# Patient Record
Sex: FEMALE | Race: WHITE | NOT HISPANIC OR LATINO | Employment: OTHER | URBAN - METROPOLITAN AREA
[De-identification: names, ages, dates, MRNs, and addresses within clinical notes are randomized per-mention and may not be internally consistent; named-entity substitution may affect disease eponyms.]

---

## 2018-07-09 ENCOUNTER — APPOINTMENT (OUTPATIENT)
Dept: GENERAL RADIOLOGY | Facility: HOSPITAL | Age: 78
End: 2018-07-09

## 2018-07-09 ENCOUNTER — HOSPITAL ENCOUNTER (OUTPATIENT)
Facility: HOSPITAL | Age: 78
Setting detail: OBSERVATION
LOS: 1 days | Discharge: HOME | End: 2018-07-12
Attending: EMERGENCY MEDICINE | Admitting: EMERGENCY MEDICINE

## 2018-07-09 ENCOUNTER — APPOINTMENT (OUTPATIENT)
Dept: CARDIOLOGY | Facility: HOSPITAL | Age: 78
End: 2018-07-09
Attending: FAMILY MEDICINE

## 2018-07-09 ENCOUNTER — APPOINTMENT (OUTPATIENT)
Dept: MRI IMAGING | Facility: HOSPITAL | Age: 78
End: 2018-07-09
Attending: EMERGENCY MEDICINE

## 2018-07-09 ENCOUNTER — APPOINTMENT (OUTPATIENT)
Dept: CT IMAGING | Facility: HOSPITAL | Age: 78
End: 2018-07-09

## 2018-07-09 DIAGNOSIS — Z74.09 IMPAIRED MOBILITY AND ADLS: ICD-10-CM

## 2018-07-09 DIAGNOSIS — I50.9 ACUTE CONGESTIVE HEART FAILURE, UNSPECIFIED CONGESTIVE HEART FAILURE TYPE: Primary | ICD-10-CM

## 2018-07-09 DIAGNOSIS — I48.20 CHRONIC ATRIAL FIBRILLATION (HCC): ICD-10-CM

## 2018-07-09 DIAGNOSIS — Z74.09 IMPAIRED FUNCTIONAL MOBILITY, BALANCE, GAIT, AND ENDURANCE: ICD-10-CM

## 2018-07-09 DIAGNOSIS — E87.6 HYPOKALEMIA: ICD-10-CM

## 2018-07-09 DIAGNOSIS — E79.0 ABNORMAL BLOOD LEVEL OF URIC ACID: ICD-10-CM

## 2018-07-09 DIAGNOSIS — M51.36 DDD (DEGENERATIVE DISC DISEASE), LUMBAR: ICD-10-CM

## 2018-07-09 DIAGNOSIS — R79.89 POSITIVE D DIMER: ICD-10-CM

## 2018-07-09 DIAGNOSIS — Z79.01 CHRONIC ANTICOAGULATION: ICD-10-CM

## 2018-07-09 DIAGNOSIS — M60.9 MYOSITIS OF LOWER EXTREMITY, UNSPECIFIED LATERALITY, UNSPECIFIED MYOSITIS TYPE: ICD-10-CM

## 2018-07-09 DIAGNOSIS — R09.02 HYPOXIA: ICD-10-CM

## 2018-07-09 DIAGNOSIS — R26.2 DETERIORATION IN ABILITY TO WALK: ICD-10-CM

## 2018-07-09 DIAGNOSIS — Z78.9 IMPAIRED MOBILITY AND ADLS: ICD-10-CM

## 2018-07-09 PROBLEM — M10.9 GOUT: Chronic | Status: ACTIVE | Noted: 2018-07-09

## 2018-07-09 PROBLEM — R73.9 HYPERGLYCEMIA: Status: ACTIVE | Noted: 2018-07-09

## 2018-07-09 PROBLEM — D64.9 ANEMIA: Status: ACTIVE | Noted: 2018-07-09

## 2018-07-09 PROBLEM — I10 HYPERTENSION: Chronic | Status: ACTIVE | Noted: 2018-07-09

## 2018-07-09 PROBLEM — R74.8 ELEVATED CK: Status: ACTIVE | Noted: 2018-07-09

## 2018-07-09 PROBLEM — R74.01 TRANSAMINITIS: Status: ACTIVE | Noted: 2018-07-09

## 2018-07-09 PROBLEM — D72.829 LEUKOCYTOSIS: Status: ACTIVE | Noted: 2018-07-09

## 2018-07-09 LAB
ALBUMIN SERPL-MCNC: 4.24 G/DL (ref 3.2–4.8)
ALBUMIN/GLOB SERPL: 1.5 G/DL (ref 1.5–2.5)
ALP SERPL-CCNC: 191 U/L (ref 25–100)
ALT SERPL W P-5'-P-CCNC: 9 U/L (ref 7–40)
ANION GAP SERPL CALCULATED.3IONS-SCNC: 10 MMOL/L (ref 3–11)
AST SERPL-CCNC: 19 U/L (ref 0–33)
BACTERIA UR QL AUTO: NORMAL /HPF
BASOPHILS # BLD AUTO: 0.06 10*3/MM3 (ref 0–0.2)
BASOPHILS NFR BLD AUTO: 0.5 % (ref 0–1)
BH CV ECHO MEAS - AI DEC SLOPE: 375.7 CM/SEC^2
BH CV ECHO MEAS - AI MAX PG: 82.4 MMHG
BH CV ECHO MEAS - AI MAX VEL: 453.4 CM/SEC
BH CV ECHO MEAS - AI P1/2T: 353.5 MSEC
BH CV ECHO MEAS - AO MAX PG (FULL): 4.4 MMHG
BH CV ECHO MEAS - AO MAX PG: 9 MMHG
BH CV ECHO MEAS - AO MEAN PG (FULL): 2.7 MMHG
BH CV ECHO MEAS - AO MEAN PG: 5 MMHG
BH CV ECHO MEAS - AO ROOT AREA (BSA CORRECTED): 1.2
BH CV ECHO MEAS - AO ROOT AREA: 4.5 CM^2
BH CV ECHO MEAS - AO ROOT DIAM: 2.4 CM
BH CV ECHO MEAS - AO V2 MAX: 152.2 CM/SEC
BH CV ECHO MEAS - AO V2 MEAN: 104.4 CM/SEC
BH CV ECHO MEAS - AO V2 VTI: 29 CM
BH CV ECHO MEAS - AVA(I,A): 2.1 CM^2
BH CV ECHO MEAS - AVA(I,D): 2.1 CM^2
BH CV ECHO MEAS - AVA(V,A): 2.2 CM^2
BH CV ECHO MEAS - AVA(V,D): 2.2 CM^2
BH CV ECHO MEAS - BSA(HAYCOCK): 2.1 M^2
BH CV ECHO MEAS - BSA: 2.1 M^2
BH CV ECHO MEAS - BZI_BMI: 32.6 KILOGRAMS/M^2
BH CV ECHO MEAS - BZI_METRIC_HEIGHT: 170.2 CM
BH CV ECHO MEAS - BZI_METRIC_WEIGHT: 94.3 KG
BH CV ECHO MEAS - CONTRAST EF 4CH: 59 ML/M^2
BH CV ECHO MEAS - EDV(MOD-SP4): 61 ML
BH CV ECHO MEAS - EF(MOD-SP4): 59 %
BH CV ECHO MEAS - ESV(MOD-SP4): 25 ML
BH CV ECHO MEAS - IVSD: 0.7 CM
BH CV ECHO MEAS - LA DIMENSION: 4.3 CM
BH CV ECHO MEAS - LA/AO: 1.8
BH CV ECHO MEAS - LAT PEAK E' VEL: 10.5 CM/SEC
BH CV ECHO MEAS - LV DIASTOLIC VOL/BSA (35-75): 29.7 ML/M^2
BH CV ECHO MEAS - LV MAX PG: 4.6 MMHG
BH CV ECHO MEAS - LV MEAN PG: 2.3 MMHG
BH CV ECHO MEAS - LV SYSTOLIC VOL/BSA (12-30): 12.2 ML/M^2
BH CV ECHO MEAS - LV V1 MAX: 107.5 CM/SEC
BH CV ECHO MEAS - LV V1 MEAN: 69.7 CM/SEC
BH CV ECHO MEAS - LV V1 VTI: 19.6 CM
BH CV ECHO MEAS - LVIDD: 4.7 CM
BH CV ECHO MEAS - LVIDS: 3 CM
BH CV ECHO MEAS - LVLD AP4: 6.3 CM
BH CV ECHO MEAS - LVLS AP4: 5.7 CM
BH CV ECHO MEAS - LVOT AREA (M): 3.1 CM^2
BH CV ECHO MEAS - LVOT AREA: 3.1 CM^2
BH CV ECHO MEAS - LVOT DIAM: 2 CM
BH CV ECHO MEAS - LVPWD: 0.7 CM
BH CV ECHO MEAS - MED PEAK E' VEL: 7.2 CM/SEC
BH CV ECHO MEAS - MV DEC TIME: 0.12 SEC
BH CV ECHO MEAS - MV E MAX VEL: 138.7 CM/SEC
BH CV ECHO MEAS - RAP SYSTOLE: 15 MMHG
BH CV ECHO MEAS - RVSP: 66 MMHG
BH CV ECHO MEAS - SI(AO): 63.7 ML/M^2
BH CV ECHO MEAS - SI(LVOT): 29.1 ML/M^2
BH CV ECHO MEAS - SI(MOD-SP4): 17.5 ML/M^2
BH CV ECHO MEAS - SV(AO): 131 ML
BH CV ECHO MEAS - SV(LVOT): 59.9 ML
BH CV ECHO MEAS - SV(MOD-SP4): 36 ML
BH CV ECHO MEAS - TAPSE (>1.6): 2 CM2
BH CV ECHO MEAS - TR MAX V: 51 MMHG
BH CV ECHO MEAS - TR MAX VEL: 358 CM/SEC
BH CV ECHO MEASUREMENTS AVERAGE E/E' RATIO: 15.67
BH CV VAS BP LEFT ARM: NORMAL MMHG
BH CV XLRA - RV BASE: 4.4 CM
BH CV XLRA - RV LENGTH: 7.1 CM
BH CV XLRA - RV MID: 3.8 CM
BH CV XLRA - TDI S': 8.9 CM/SEC
BILIRUB SERPL-MCNC: 1.1 MG/DL (ref 0.3–1.2)
BILIRUB UR QL STRIP: NEGATIVE
BNP SERPL-MCNC: 420 PG/ML (ref 0–100)
BUN BLD-MCNC: 22 MG/DL (ref 9–23)
BUN/CREAT SERPL: 19.1 (ref 7–25)
CALCIUM SPEC-SCNC: 8.3 MG/DL (ref 8.7–10.4)
CHLORIDE SERPL-SCNC: 108 MMOL/L (ref 99–109)
CK SERPL-CCNC: 342 U/L (ref 26–174)
CLARITY UR: CLEAR
CO2 SERPL-SCNC: 26 MMOL/L (ref 20–31)
COLOR UR: YELLOW
CREAT BLD-MCNC: 1.15 MG/DL (ref 0.6–1.3)
D DIMER PPP FEU-MCNC: 1.36 MG/L (FEU) (ref 0–0.5)
DEPRECATED RDW RBC AUTO: 57.3 FL (ref 37–54)
EOSINOPHIL # BLD AUTO: 0.21 10*3/MM3 (ref 0–0.3)
EOSINOPHIL NFR BLD AUTO: 1.8 % (ref 0–3)
ERYTHROCYTE [DISTWIDTH] IN BLOOD BY AUTOMATED COUNT: 18.8 % (ref 11.3–14.5)
ERYTHROCYTE [SEDIMENTATION RATE] IN BLOOD: 39 MM/HR (ref 0–30)
GFR SERPL CREATININE-BSD FRML MDRD: 46 ML/MIN/1.73
GFR SERPL CREATININE-BSD FRML MDRD: 55 ML/MIN/1.73
GLOBULIN UR ELPH-MCNC: 2.8 GM/DL
GLUCOSE BLD-MCNC: 159 MG/DL (ref 70–100)
GLUCOSE UR STRIP-MCNC: NEGATIVE MG/DL
HAV IGM SERPL QL IA: NORMAL
HBV CORE IGM SERPL QL IA: NORMAL
HBV SURFACE AG SERPL QL IA: NORMAL
HCT VFR BLD AUTO: 32 % (ref 34.5–44)
HCV AB SER DONR QL: NORMAL
HGB BLD-MCNC: 9.8 G/DL (ref 11.5–15.5)
HGB UR QL STRIP.AUTO: ABNORMAL
HOLD SPECIMEN: NORMAL
HOLD SPECIMEN: NORMAL
HYALINE CASTS UR QL AUTO: NORMAL /LPF
IMM GRANULOCYTES # BLD: 0.06 10*3/MM3 (ref 0–0.03)
IMM GRANULOCYTES NFR BLD: 0.5 % (ref 0–0.6)
INR PPP: 1.95 (ref 0.91–1.09)
INR PPP: 1.96 (ref 0.91–1.09)
KETONES UR QL STRIP: NEGATIVE
LEFT ATRIUM VOLUME INDEX: 55 ML/M2
LEFT ATRIUM VOLUME: 114 CM3
LEUKOCYTE ESTERASE UR QL STRIP.AUTO: ABNORMAL
LV EF 2D ECHO EST: 70 %
LYMPHOCYTES # BLD AUTO: 1.19 10*3/MM3 (ref 0.6–4.8)
LYMPHOCYTES NFR BLD AUTO: 10.2 % (ref 24–44)
MAXIMAL PREDICTED HEART RATE: 143 BPM
MCH RBC QN AUTO: 26.1 PG (ref 27–31)
MCHC RBC AUTO-ENTMCNC: 30.6 G/DL (ref 32–36)
MCV RBC AUTO: 85.1 FL (ref 80–99)
MONOCYTES # BLD AUTO: 1.09 10*3/MM3 (ref 0–1)
MONOCYTES NFR BLD AUTO: 9.4 % (ref 0–12)
NEUTROPHILS # BLD AUTO: 9.09 10*3/MM3 (ref 1.5–8.3)
NEUTROPHILS NFR BLD AUTO: 78.1 % (ref 41–71)
NITRITE UR QL STRIP: NEGATIVE
PH UR STRIP.AUTO: <=5 [PH] (ref 5–8)
PLATELET # BLD AUTO: 243 10*3/MM3 (ref 150–450)
PMV BLD AUTO: 11.4 FL (ref 6–12)
POTASSIUM BLD-SCNC: 3.3 MMOL/L (ref 3.5–5.5)
PROT SERPL-MCNC: 7 G/DL (ref 5.7–8.2)
PROT UR QL STRIP: ABNORMAL
PROTHROMBIN TIME: 20.5 SECONDS (ref 9.6–11.5)
PROTHROMBIN TIME: 20.6 SECONDS (ref 9.6–11.5)
RBC # BLD AUTO: 3.76 10*6/MM3 (ref 3.89–5.14)
RBC # UR: NORMAL /HPF
REF LAB TEST METHOD: NORMAL
SODIUM BLD-SCNC: 144 MMOL/L (ref 132–146)
SP GR UR STRIP: 1.02 (ref 1–1.03)
SQUAMOUS #/AREA URNS HPF: NORMAL /HPF
STRESS TARGET HR: 122 BPM
TROPONIN I SERPL-MCNC: 0.02 NG/ML (ref 0–0.07)
TSH SERPL DL<=0.05 MIU/L-ACNC: 2.01 MIU/ML (ref 0.35–5.35)
URATE SERPL-MCNC: 8.4 MG/DL (ref 3.1–7.8)
UROBILINOGEN UR QL STRIP: ABNORMAL
WBC NRBC COR # BLD: 11.64 10*3/MM3 (ref 3.5–10.8)
WBC UR QL AUTO: NORMAL /HPF
WHOLE BLOOD HOLD SPECIMEN: NORMAL
WHOLE BLOOD HOLD SPECIMEN: NORMAL

## 2018-07-09 PROCEDURE — 96374 THER/PROPH/DIAG INJ IV PUSH: CPT

## 2018-07-09 PROCEDURE — 80074 ACUTE HEPATITIS PANEL: CPT | Performed by: FAMILY MEDICINE

## 2018-07-09 PROCEDURE — 96375 TX/PRO/DX INJ NEW DRUG ADDON: CPT

## 2018-07-09 PROCEDURE — 84484 ASSAY OF TROPONIN QUANT: CPT

## 2018-07-09 PROCEDURE — 85652 RBC SED RATE AUTOMATED: CPT | Performed by: EMERGENCY MEDICINE

## 2018-07-09 PROCEDURE — 96372 THER/PROPH/DIAG INJ SC/IM: CPT

## 2018-07-09 PROCEDURE — 80053 COMPREHEN METABOLIC PANEL: CPT | Performed by: EMERGENCY MEDICINE

## 2018-07-09 PROCEDURE — 99220 PR INITIAL OBSERVATION CARE/DAY 70 MINUTES: CPT | Performed by: FAMILY MEDICINE

## 2018-07-09 PROCEDURE — 85610 PROTHROMBIN TIME: CPT | Performed by: FAMILY MEDICINE

## 2018-07-09 PROCEDURE — 72148 MRI LUMBAR SPINE W/O DYE: CPT

## 2018-07-09 PROCEDURE — 93306 TTE W/DOPPLER COMPLETE: CPT

## 2018-07-09 PROCEDURE — 85610 PROTHROMBIN TIME: CPT | Performed by: EMERGENCY MEDICINE

## 2018-07-09 PROCEDURE — G0378 HOSPITAL OBSERVATION PER HR: HCPCS

## 2018-07-09 PROCEDURE — 25010000002 ENOXAPARIN PER 10 MG: Performed by: FAMILY MEDICINE

## 2018-07-09 PROCEDURE — 99284 EMERGENCY DEPT VISIT MOD MDM: CPT

## 2018-07-09 PROCEDURE — 93005 ELECTROCARDIOGRAM TRACING: CPT | Performed by: EMERGENCY MEDICINE

## 2018-07-09 PROCEDURE — 25010000002 FUROSEMIDE PER 20 MG: Performed by: EMERGENCY MEDICINE

## 2018-07-09 PROCEDURE — 84550 ASSAY OF BLOOD/URIC ACID: CPT | Performed by: EMERGENCY MEDICINE

## 2018-07-09 PROCEDURE — 93306 TTE W/DOPPLER COMPLETE: CPT | Performed by: INTERNAL MEDICINE

## 2018-07-09 PROCEDURE — 71045 X-RAY EXAM CHEST 1 VIEW: CPT

## 2018-07-09 PROCEDURE — 71275 CT ANGIOGRAPHY CHEST: CPT

## 2018-07-09 PROCEDURE — 81001 URINALYSIS AUTO W/SCOPE: CPT | Performed by: FAMILY MEDICINE

## 2018-07-09 PROCEDURE — 96376 TX/PRO/DX INJ SAME DRUG ADON: CPT

## 2018-07-09 PROCEDURE — 83880 ASSAY OF NATRIURETIC PEPTIDE: CPT | Performed by: EMERGENCY MEDICINE

## 2018-07-09 PROCEDURE — 25010000002 FUROSEMIDE PER 20 MG: Performed by: FAMILY MEDICINE

## 2018-07-09 PROCEDURE — 82550 ASSAY OF CK (CPK): CPT | Performed by: EMERGENCY MEDICINE

## 2018-07-09 PROCEDURE — 0 IOPAMIDOL PER 1 ML: Performed by: FAMILY MEDICINE

## 2018-07-09 PROCEDURE — 84443 ASSAY THYROID STIM HORMONE: CPT | Performed by: EMERGENCY MEDICINE

## 2018-07-09 PROCEDURE — 85379 FIBRIN DEGRADATION QUANT: CPT | Performed by: EMERGENCY MEDICINE

## 2018-07-09 PROCEDURE — 85025 COMPLETE CBC W/AUTO DIFF WBC: CPT | Performed by: EMERGENCY MEDICINE

## 2018-07-09 PROCEDURE — 25010000002 DEXAMETHASONE PER 1 MG: Performed by: EMERGENCY MEDICINE

## 2018-07-09 RX ORDER — FUROSEMIDE 10 MG/ML
20 INJECTION INTRAMUSCULAR; INTRAVENOUS ONCE
Status: COMPLETED | OUTPATIENT
Start: 2018-07-09 | End: 2018-07-09

## 2018-07-09 RX ORDER — FUROSEMIDE 20 MG/1
40 TABLET ORAL DAILY
COMMUNITY

## 2018-07-09 RX ORDER — METOPROLOL SUCCINATE 50 MG/1
200 TABLET, EXTENDED RELEASE ORAL DAILY
Status: DISCONTINUED | OUTPATIENT
Start: 2018-07-10 | End: 2018-07-12 | Stop reason: HOSPADM

## 2018-07-09 RX ORDER — IPRATROPIUM BROMIDE AND ALBUTEROL SULFATE 2.5; .5 MG/3ML; MG/3ML
3 SOLUTION RESPIRATORY (INHALATION) EVERY 4 HOURS PRN
Status: DISCONTINUED | OUTPATIENT
Start: 2018-07-09 | End: 2018-07-12 | Stop reason: HOSPADM

## 2018-07-09 RX ORDER — PROMETHAZINE HYDROCHLORIDE 25 MG/ML
12.5 INJECTION, SOLUTION INTRAMUSCULAR; INTRAVENOUS EVERY 6 HOURS PRN
Status: DISCONTINUED | OUTPATIENT
Start: 2018-07-09 | End: 2018-07-12 | Stop reason: HOSPADM

## 2018-07-09 RX ORDER — POTASSIUM CHLORIDE 1.5 G/1.77G
40 POWDER, FOR SOLUTION ORAL AS NEEDED
Status: DISCONTINUED | OUTPATIENT
Start: 2018-07-09 | End: 2018-07-12 | Stop reason: HOSPADM

## 2018-07-09 RX ORDER — WARFARIN SODIUM 1 MG/1
1 TABLET ORAL
Status: DISCONTINUED | OUTPATIENT
Start: 2018-07-10 | End: 2018-07-12

## 2018-07-09 RX ORDER — ALLOPURINOL 100 MG/1
100 TABLET ORAL 2 TIMES DAILY
Status: DISCONTINUED | OUTPATIENT
Start: 2018-07-09 | End: 2018-07-12 | Stop reason: HOSPADM

## 2018-07-09 RX ORDER — LISINOPRIL 5 MG/1
5 TABLET ORAL DAILY
Status: DISCONTINUED | OUTPATIENT
Start: 2018-07-09 | End: 2018-07-12 | Stop reason: HOSPADM

## 2018-07-09 RX ORDER — ACETAMINOPHEN 325 MG/1
650 TABLET ORAL EVERY 6 HOURS PRN
Status: DISCONTINUED | OUTPATIENT
Start: 2018-07-09 | End: 2018-07-12 | Stop reason: HOSPADM

## 2018-07-09 RX ORDER — PROMETHAZINE HYDROCHLORIDE 12.5 MG/1
12.5 TABLET ORAL EVERY 6 HOURS PRN
Status: DISCONTINUED | OUTPATIENT
Start: 2018-07-09 | End: 2018-07-12 | Stop reason: HOSPADM

## 2018-07-09 RX ORDER — POTASSIUM CHLORIDE 750 MG/1
40 CAPSULE, EXTENDED RELEASE ORAL AS NEEDED
Status: DISCONTINUED | OUTPATIENT
Start: 2018-07-09 | End: 2018-07-12 | Stop reason: HOSPADM

## 2018-07-09 RX ORDER — PROMETHAZINE HYDROCHLORIDE 12.5 MG/1
12.5 SUPPOSITORY RECTAL EVERY 6 HOURS PRN
Status: DISCONTINUED | OUTPATIENT
Start: 2018-07-09 | End: 2018-07-12 | Stop reason: HOSPADM

## 2018-07-09 RX ORDER — POTASSIUM CHLORIDE 7.45 MG/ML
10 INJECTION INTRAVENOUS
Status: DISCONTINUED | OUTPATIENT
Start: 2018-07-09 | End: 2018-07-12 | Stop reason: HOSPADM

## 2018-07-09 RX ORDER — WARFARIN SODIUM 1 MG/1
1 TABLET ORAL
COMMUNITY

## 2018-07-09 RX ORDER — SODIUM CHLORIDE 0.9 % (FLUSH) 0.9 %
10 SYRINGE (ML) INJECTION AS NEEDED
Status: DISCONTINUED | OUTPATIENT
Start: 2018-07-09 | End: 2018-07-12 | Stop reason: HOSPADM

## 2018-07-09 RX ORDER — CALCIUM CARBONATE 200(500)MG
2 TABLET,CHEWABLE ORAL 2 TIMES DAILY PRN
Status: DISCONTINUED | OUTPATIENT
Start: 2018-07-09 | End: 2018-07-12 | Stop reason: HOSPADM

## 2018-07-09 RX ORDER — DEXAMETHASONE SODIUM PHOSPHATE 4 MG/ML
8 INJECTION, SOLUTION INTRA-ARTICULAR; INTRALESIONAL; INTRAMUSCULAR; INTRAVENOUS; SOFT TISSUE ONCE
Status: COMPLETED | OUTPATIENT
Start: 2018-07-09 | End: 2018-07-09

## 2018-07-09 RX ORDER — SODIUM CHLORIDE 0.9 % (FLUSH) 0.9 %
1-10 SYRINGE (ML) INJECTION AS NEEDED
Status: DISCONTINUED | OUTPATIENT
Start: 2018-07-09 | End: 2018-07-12 | Stop reason: HOSPADM

## 2018-07-09 RX ORDER — METOPROLOL SUCCINATE 200 MG/1
200 TABLET, EXTENDED RELEASE ORAL DAILY
COMMUNITY

## 2018-07-09 RX ORDER — AMLODIPINE BESYLATE 5 MG/1
5 TABLET ORAL DAILY
COMMUNITY
End: 2018-07-12 | Stop reason: HOSPADM

## 2018-07-09 RX ORDER — LIDOCAINE 50 MG/G
1 PATCH TOPICAL
Status: DISCONTINUED | OUTPATIENT
Start: 2018-07-09 | End: 2018-07-12 | Stop reason: HOSPADM

## 2018-07-09 RX ORDER — ALLOPURINOL 100 MG/1
100 TABLET ORAL 2 TIMES DAILY
COMMUNITY

## 2018-07-09 RX ORDER — POTASSIUM CHLORIDE 750 MG/1
40 CAPSULE, EXTENDED RELEASE ORAL ONCE
Status: COMPLETED | OUTPATIENT
Start: 2018-07-09 | End: 2018-07-09

## 2018-07-09 RX ORDER — FUROSEMIDE 10 MG/ML
20 INJECTION INTRAMUSCULAR; INTRAVENOUS EVERY 12 HOURS
Status: DISCONTINUED | OUTPATIENT
Start: 2018-07-09 | End: 2018-07-12

## 2018-07-09 RX ORDER — FUROSEMIDE 10 MG/ML
20 INJECTION INTRAMUSCULAR; INTRAVENOUS ONCE
Status: DISCONTINUED | OUTPATIENT
Start: 2018-07-09 | End: 2018-07-09

## 2018-07-09 RX ADMIN — POTASSIUM CHLORIDE 40 MEQ: 750 CAPSULE, EXTENDED RELEASE ORAL at 12:55

## 2018-07-09 RX ADMIN — POTASSIUM CHLORIDE 40 MEQ: 750 CAPSULE, EXTENDED RELEASE ORAL at 16:52

## 2018-07-09 RX ADMIN — ENOXAPARIN SODIUM 30 MG: 30 INJECTION SUBCUTANEOUS at 16:52

## 2018-07-09 RX ADMIN — DEXAMETHASONE SODIUM PHOSPHATE 8 MG: 4 INJECTION, SOLUTION INTRAMUSCULAR; INTRAVENOUS at 12:56

## 2018-07-09 RX ADMIN — ALLOPURINOL 100 MG: 100 TABLET ORAL at 21:18

## 2018-07-09 RX ADMIN — FUROSEMIDE 20 MG: 10 INJECTION, SOLUTION INTRAMUSCULAR; INTRAVENOUS at 21:18

## 2018-07-09 RX ADMIN — LISINOPRIL 5 MG: 5 TABLET ORAL at 16:52

## 2018-07-09 RX ADMIN — IOPAMIDOL 90 ML: 755 INJECTION, SOLUTION INTRAVENOUS at 13:23

## 2018-07-09 RX ADMIN — FUROSEMIDE 20 MG: 10 INJECTION, SOLUTION INTRAMUSCULAR; INTRAVENOUS at 14:20

## 2018-07-09 NOTE — ED PROVIDER NOTES
Subjective   Ms. Zee Stewart is a 78 y.o. female who presents to the ED with c/o SoA. She reports that on June 25th she had an appointment to see her PCP, Dr. Becker, for cellulitis and was able to drive herself there with no problems. Then on July 5th she went back to her PCP for a follow up but fell that morning and has not been able to walk 2/2 leg weakness since then. She also complains of leg swelling, back pain that radiates down her left leg, and worsening SoA over the last few days. However, she denies CP, constipation, diarrhea, a fever, a cough, a runny nose, hematuria, hematochezia, nausea, and vomiting. She has a hx of a-fib, HTN, and arthritis. Her cardiologist is Dr. Rodriguez. She has no hx of a heart cath, or cardiac stents. No other acute complaints at this time.         History provided by:  Patient  Shortness of Breath   Severity:  Moderate  Onset quality:  Gradual  Duration:  4 days  Timing:  Constant  Progression:  Worsening  Chronicity:  New  Associated symptoms: no chest pain, no cough, no fever and no vomiting        Review of Systems   Constitutional: Negative for fever.   HENT: Negative for rhinorrhea.    Respiratory: Positive for shortness of breath. Negative for cough.    Cardiovascular: Positive for leg swelling. Negative for chest pain.   Gastrointestinal: Negative for constipation, diarrhea, nausea and vomiting.   Neurological: Positive for weakness.   All other systems reviewed and are negative.      Past Medical History:   Diagnosis Date   • Arthritis    • Atrial fibrillation (CMS/HCC)    • Cellulitis    • CHF (congestive heart failure) (CMS/HCC)    • Coronary artery disease    • Gout    • Hypertension        No Known Allergies    Past Surgical History:   Procedure Laterality Date   • ABDOMINAL SURGERY     • CYSTOSCOPY W/ LASER LITHOTRIPSY     • TUBAL ABDOMINAL LIGATION     • URETEROSCOPY STENT INSERTION         Family History   Problem Relation Age of Onset   • Heart disease Father         Social History     Social History   • Marital status:      Social History Main Topics   • Smoking status: Former Smoker      Comment: QUIT 50 YEARS AGO   • Alcohol use No   • Drug use: No     Other Topics Concern   • Not on file     Social History Narrative    Lives alone         Objective   Physical Exam   Constitutional: She is oriented to person, place, and time. She appears well-developed and well-nourished. No distress.   HENT:   Head: Normocephalic and atraumatic.   Nose: Nose normal.   Mouth/Throat: Oropharynx is clear and moist.   Eyes: Conjunctivae and EOM are normal. Pupils are equal, round, and reactive to light. No scleral icterus.   Neck: Normal range of motion. Neck supple. No JVD present. Carotid bruit is not present.   Cardiovascular: Normal heart sounds and intact distal pulses.  An irregular rhythm present. Tachycardia present.  Exam reveals no gallop and no friction rub.    No murmur heard.  3/4 pulses in bilateral feet.   Pulmonary/Chest: No respiratory distress. She has rales.   Patient has moderately increased work of breathing at rest. Rales in bilateral bases.   Abdominal: Soft. There is no tenderness.   Abdomen is obese.   Musculoskeletal: Normal range of motion.   Patient has mild to moderate edema to her BLE, which is better than in the past and goes 1/3 up the legs.    Neurological: She is alert and oriented to person, place, and time.   Reflex Scores:       Patellar reflexes are 1+ on the right side and 1+ on the left side.       Achilles reflexes are 1+ on the right side and 1+ on the left side.  Weakness to BLE with left worse than right. Sensation is normal in the BLE. No paresthesias to perianal region or buttocks.   Skin: Skin is warm and dry. She is not diaphoretic.   Patient has warmth consistent with chronic venous stasis dermatitis with no tissue loss.    Psychiatric: She has a normal mood and affect. Her behavior is normal.   Nursing note and vitals  reviewed.      Procedures         ED Course       Recent Results (from the past 24 hour(s))   BNP    Collection Time: 07/09/18  9:04 AM   Result Value Ref Range    .0 (H) 0.0 - 100.0 pg/mL   Lavender Top    Collection Time: 07/09/18  9:04 AM   Result Value Ref Range    Extra Tube hold for add-on    CBC Auto Differential    Collection Time: 07/09/18  9:04 AM   Result Value Ref Range    WBC 11.64 (H) 3.50 - 10.80 10*3/mm3    RBC 3.76 (L) 3.89 - 5.14 10*6/mm3    Hemoglobin 9.8 (L) 11.5 - 15.5 g/dL    Hematocrit 32.0 (L) 34.5 - 44.0 %    MCV 85.1 80.0 - 99.0 fL    MCH 26.1 (L) 27.0 - 31.0 pg    MCHC 30.6 (L) 32.0 - 36.0 g/dL    RDW 18.8 (H) 11.3 - 14.5 %    RDW-SD 57.3 (H) 37.0 - 54.0 fl    MPV 11.4 6.0 - 12.0 fL    Platelets 243 150 - 450 10*3/mm3    Neutrophil % 78.1 (H) 41.0 - 71.0 %    Lymphocyte % 10.2 (L) 24.0 - 44.0 %    Monocyte % 9.4 0.0 - 12.0 %    Eosinophil % 1.8 0.0 - 3.0 %    Basophil % 0.5 0.0 - 1.0 %    Immature Grans % 0.5 0.0 - 0.6 %    Neutrophils, Absolute 9.09 (H) 1.50 - 8.30 10*3/mm3    Lymphocytes, Absolute 1.19 0.60 - 4.80 10*3/mm3    Monocytes, Absolute 1.09 (H) 0.00 - 1.00 10*3/mm3    Eosinophils, Absolute 0.21 0.00 - 0.30 10*3/mm3    Basophils, Absolute 0.06 0.00 - 0.20 10*3/mm3    Immature Grans, Absolute 0.06 (H) 0.00 - 0.03 10*3/mm3   Sedimentation Rate    Collection Time: 07/09/18  9:04 AM   Result Value Ref Range    Sed Rate 39 (H) 0 - 30 mm/hr   Comprehensive Metabolic Panel    Collection Time: 07/09/18  9:05 AM   Result Value Ref Range    Glucose 159 (H) 70 - 100 mg/dL    BUN 22 9 - 23 mg/dL    Creatinine 1.15 0.60 - 1.30 mg/dL    Sodium 144 132 - 146 mmol/L    Potassium 3.3 (L) 3.5 - 5.5 mmol/L    Chloride 108 99 - 109 mmol/L    CO2 26.0 20.0 - 31.0 mmol/L    Calcium 8.3 (L) 8.7 - 10.4 mg/dL    Total Protein 7.0 5.7 - 8.2 g/dL    Albumin 4.24 3.20 - 4.80 g/dL    ALT (SGPT) 9 7 - 40 U/L    AST (SGOT) 19 0 - 33 U/L    Alkaline Phosphatase 191 (H) 25 - 100 U/L    Total Bilirubin  1.1 0.3 - 1.2 mg/dL    eGFR Non African Amer 46 (L) >60 mL/min/1.73    eGFR  African Amer 55 (L) >60 mL/min/1.73    Globulin 2.8 gm/dL    A/G Ratio 1.5 1.5 - 2.5 g/dL    BUN/Creatinine Ratio 19.1 7.0 - 25.0    Anion Gap 10.0 3.0 - 11.0 mmol/L   Light Blue Top    Collection Time: 07/09/18  9:05 AM   Result Value Ref Range    Extra Tube hold for add-on    Green Top (Gel)    Collection Time: 07/09/18  9:05 AM   Result Value Ref Range    Extra Tube Hold for add-ons.    Gold Top - SST    Collection Time: 07/09/18  9:05 AM   Result Value Ref Range    Extra Tube Hold for add-ons.    TSH    Collection Time: 07/09/18  9:05 AM   Result Value Ref Range    TSH 2.010 0.350 - 5.350 mIU/mL   CK    Collection Time: 07/09/18  9:05 AM   Result Value Ref Range    Creatine Kinase 342 (H) 26 - 174 U/L   Protime-INR    Collection Time: 07/09/18  9:05 AM   Result Value Ref Range    Protime 20.5 (H) 9.6 - 11.5 Seconds    INR 1.95 (H) 0.91 - 1.09   D-dimer, Quantitative    Collection Time: 07/09/18  9:05 AM   Result Value Ref Range    D-Dimer, Quantitative 1.36 (H) 0.00 - 0.50 mg/L (FEU)   Uric Acid    Collection Time: 07/09/18  9:05 AM   Result Value Ref Range    Uric Acid 8.4 (H) 3.1 - 7.8 mg/dL   POC Troponin, Rapid    Collection Time: 07/09/18  9:12 AM   Result Value Ref Range    Troponin I 0.02 0.00 - 0.07 ng/mL   Protime-INR    Collection Time: 07/09/18  2:58 PM   Result Value Ref Range    Protime 20.6 (H) 9.6 - 11.5 Seconds    INR 1.96 (H) 0.91 - 1.09   Adult Transthoracic Echo Complete W/ Cont if Necessary Per Protocol    Collection Time: 07/09/18  3:57 PM   Result Value Ref Range    BSA 2.1 m^2    Ao root diam 2.4 cm    Ao root area 4.5 cm^2    LA dimension 4.3 cm    LA/Ao 1.8     LVOT diam 2.0 cm    LVOT area 3.1 cm^2    LVOT area(traced) 3.1 cm^2    LVLd ap4 6.3 cm    EDV(MOD-sp4) 61.0 ml    LVLs ap4 5.7 cm    ESV(MOD-sp4) 25.0 ml    EF(MOD-sp4) 59.0 %    SV(MOD-sp4) 36.0 ml    SI(MOD-sp4) 17.5 ml/m^2    Ao root area (BSA  corrected) 1.2     CONTRAST EF 4CH 59.0 ml/m^2    LV Diastolic corrected for BSA 29.7 ml/m^2    LV Systolic corrected for BSA 12.2 ml/m^2    MV E max feng 138.7 cm/sec    MV dec time 0.12 sec    Ao pk feng 152.2 cm/sec    Ao max PG 9.0 mmHg    Ao max PG (full) 4.4 mmHg    Ao V2 mean 104.4 cm/sec    Ao mean PG 5.0 mmHg    Ao mean PG (full) 2.7 mmHg    Ao V2 VTI 29.0 cm    JOVANNI(I,A) 2.1 cm^2    JOVANNI(I,D) 2.1 cm^2    JOVANNI(V,A) 2.2 cm^2    JOVANNI(V,D) 2.2 cm^2    AI max feng 453.4 cm/sec    AI max PG 82.4 mmHg    AI dec slope 375.7 cm/sec^2    AI P1/2t 353.5 msec    LV V1 max PG 4.6 mmHg    LV V1 mean PG 2.3 mmHg    LV V1 max 107.5 cm/sec    LV V1 mean 69.7 cm/sec    LV V1 VTI 19.6 cm    SV(Ao) 131.0 ml    SI(Ao) 63.7 ml/m^2    SV(LVOT) 59.9 ml    SI(LVOT) 29.1 ml/m^2    TR max feng 358 cm/sec     CV ECHO CHRISTIAN - BZI_BMI 32.6 kilograms/m^2     CV ECHO CHRISTIAN - BSA([facility]) 2.1 m^2     CV ECHO CHRISTIAN - BZI_METRIC_WEIGHT 94.3 kg     CV ECHO CHRISTIAN - BZI_METRIC_HEIGHT 170.2 cm    Target HR (85%) 122 bpm    Max. Pred. HR (100%) 143 bpm     CV VAS BP LEFT /82 mmHg    TDI S' 8.90 cm/sec    RV Base 4.40 cm    RV Length 7.10 cm    RV Mid 3.80 cm    LA volume 114.0 cm3    LA Volume Index 55.0 mL/m2    Avg E/e' ratio 15.67     Lat Peak E' Feng 10.5 cm/sec    LVPWd 0.7 cm    Med Peak E' Feng 7.20 cm/sec    RAP systole 15 mmHg    RVSP(TR) 66 mmHg    TR max grad 51 mmHg    IVSd 0.7 cm    LVIDd 4.7 cm    LVIDs 2.9 cm    TAPSE (>1.6) 2.00 cm2     Note: In addition to lab results from this visit, the labs listed above may include labs taken at another facility or during a different encounter within the last 24 hours. Please correlate lab times with ED admission and discharge times for further clarification of the services performed during this visit.    CT Angiogram Chest With Contrast   Final Result   Enlargement of the heart with small bilateral pleural   effusions left greater than right. No evidence of filling defect to   suggest  "pulmonary embolism.  No acute parenchymal disease.       D:  07/09/2018   E:  07/09/2018           This report was finalized on 7/9/2018 2:50 PM by Dr. Dulce Maria Us MD.          MRI Lumbar Spine Without Contrast   Final Result   Narrowing of the neural foramina bilaterally at the L3/L4   and L4/L5 level. Narrowing as well seen at the left neural foramina at   the L2/L3 level. No definite nerve root compromise identified. Contact   cannot be excluded on the left at the L2/L3 level.       D:  07/09/2018   E:  07/09/2018                This report was finalized on 7/9/2018 1:01 PM by Dr. Dulce Maria Us MD.          XR Chest 1 View   Final Result   Heart is enlarged with slight prominence of the pulmonary   vascularity with no acute parenchymal disease.       D:  07/09/2018   E:  07/09/2018       This report was finalized on 7/9/2018 10:27 AM by Dr. Dulce Maria Us MD.            Vitals:    07/09/18 1310 07/09/18 1439 07/09/18 1441 07/09/18 1557   BP:  160/70 154/82    BP Location:  Right arm Left arm    Patient Position:  Lying Lying    Pulse: 88  79    Resp:  22     Temp:  98.1 °F (36.7 °C)     TempSrc:  Oral     SpO2: 96% 91% 93%    Weight:    94.3 kg (208 lb)   Height:    170 cm (66.93\")     Medications   sodium chloride 0.9 % flush 10 mL (not administered)   allopurinol (ZYLOPRIM) tablet 100 mg (not administered)   metoprolol succinate XL (TOPROL-XL) 24 hr tablet 200 mg (not administered)   traMADol-acetaminophen (ULTRACET)  mg combo dose (not administered)   sodium chloride 0.9 % flush 1-10 mL (not administered)   sodium chloride 0.9 % flush 1-10 mL (not administered)   enoxaparin (LOVENOX) syringe 30 mg (not administered)   acetaminophen (TYLENOL) tablet 650 mg (not administered)   furosemide (LASIX) injection 20 mg (not administered)   potassium chloride (MICRO-K) CR capsule 40 mEq (not administered)     Or   potassium chloride (KLOR-CON) packet 40 mEq (not administered)     Or "   potassium chloride 10 mEq in 100 mL IVPB (not administered)   calcium carbonate (TUMS) chewable tablet 500 mg (200 mg elemental) (not administered)   promethazine (PHENERGAN) tablet 12.5 mg (not administered)     Or   promethazine (PHENERGAN) injection 12.5 mg (not administered)     Or   promethazine (PHENERGAN) injection 12.5 mg (not administered)     Or   promethazine (PHENERGAN) suppository 12.5 mg (not administered)   lisinopril (PRINIVIL,ZESTRIL) tablet 5 mg (not administered)   Pharmacy to dose warfarin (not administered)   lidocaine (LIDODERM) 5 % 1 patch (not administered)   Pharmacy Meds to Bed Consult (not administered)   warfarin (COUMADIN) tablet 1 mg (not administered)   dexamethasone (DECADRON) injection 8 mg (8 mg Intravenous Given 7/9/18 1256)   potassium chloride (MICRO-K) CR capsule 40 mEq (40 mEq Oral Given 7/9/18 1255)   iopamidol (ISOVUE-370) 76 % injection 100 mL (90 mL Intravenous Given 7/9/18 1323)   furosemide (LASIX) injection 20 mg (20 mg Intravenous Given 7/9/18 1420)     ECG/EMG Results (last 24 hours)     Procedure Component Value Units Date/Time    ECG 12 Lead [[id number]] Collected:  07/09/18 0857     Updated:  07/09/18 0901                      MDM  Number of Diagnoses or Management Options  Abnormal blood level of uric acid:   Acute congestive heart failure, unspecified congestive heart failure type (CMS/HCC):   Chronic anticoagulation:   Chronic atrial fibrillation (CMS/HCC):   DDD (degenerative disc disease), lumbar:   Deterioration in ability to walk:   Hypokalemia:   Hypoxia:   Myositis of lower extremity, unspecified laterality, unspecified myositis type:   Positive D dimer:   Diagnosis management comments:       Reviewed all available studies at the bedside with the patient and her family.     Her MRI of the lumbosacral spine shows moderately advanced degenerative changes but not to the degree that I think would impinge her ability to walk severely enough that may cause her  pain and some sciatica.    She also has what appears to be  Said congestive heart failure.  She has no history of this in the past that she does have chronic A. fib.  Her d-dimer is also elevated with her history dyspnea think we need to do a CTA of the chest.  Be unusual to have pulmonary embolus on Coumadin therapy but apparently she's been subtherapeutic in the past.    She'll probably also need echo and duplexes of her legs.    She also appears to have a myositis with an elevated sedimentation rate and an elevated CK.  Her uric acid is also elevated though she doesn't have a what I would consider classic gout.  Her pain is mostly in her hips.  Her knees and hips and ankles with are not inflamed but only it's unreasonable give her 10 mg of Decadron to see if that helps both her back and her joints and also  To see if it would help her myositis .    The patient needs to be admitted the hospital for further evaluation.  I will also give her dose of diuretic and potassium is her K is a bit low.    I spoke with Dr. Loera, on-call hospital medicine, and she will admit the patient.    All are agreeable with plan       Amount and/or Complexity of Data Reviewed  Clinical lab tests: reviewed  Tests in the radiology section of CPT®: reviewed  Tests in the medicine section of CPT®: reviewed        Final diagnoses:   Acute congestive heart failure, unspecified congestive heart failure type (CMS/HCC)   Hypoxia   Myositis of lower extremity, unspecified laterality, unspecified myositis type   Abnormal blood level of uric acid   Positive D dimer   Chronic anticoagulation   Chronic atrial fibrillation (CMS/HCC)   DDD (degenerative disc disease), lumbar   Deterioration in ability to walk   Hypokalemia       Documentation assistance provided by cheikh Williamson.  Information recorded by the fanibaileen was done at my direction and has been verified and validated by me.     Monica Williamson  07/09/18 8917       Zach Sesay,  MD  07/09/18 8749

## 2018-07-09 NOTE — PLAN OF CARE
Problem: Patient Care Overview  Goal: Plan of Care Review  Outcome: Ongoing (interventions implemented as appropriate)   07/09/18 0349   Coping/Psychosocial   Plan of Care Reviewed With patient;zuri   Plan of Care Review   Progress no change   OTHER   Outcome Summary Admit from ER. IV lasix given, K+ replaced. Continue current care       Problem: Cardiac: Heart Failure (Adult)  Goal: Signs and Symptoms of Listed Potential Problems Will be Absent, Minimized or Managed (Cardiac: Heart Failure)  Outcome: Ongoing (interventions implemented as appropriate)      Problem: Fall Risk (Adult)  Goal: Identify Related Risk Factors and Signs and Symptoms  Outcome: Outcome(s) achieved Date Met: 07/09/18    Goal: Absence of Fall  Outcome: Ongoing (interventions implemented as appropriate)

## 2018-07-09 NOTE — H&P
"    [facility] Medicine Services  HISTORY AND PHYSICAL    Patient Name: Zee Stewart  : 1940  MRN: [id number]  Primary Care Physician: Raymond Becker MD    Subjective   Subjective     Chief Complaint:  SOA, back and left leg pain    HPI:  Zee Stewart is a 78 y.o. female with PMH significant for chronic atrial fib (on warfarin and followed by Dr. Rodriguez), HTN, gout and recent celluitis BLE, now improved (keflex prescribed ). She comes today because she has been more SOA than usual but has had a marked increase in her SOA in the last 24 hours associated with wheezing. She does not smoke and has not ever been diagnosed with COPD or asthma.  She also reports increased BLE edema, abdominal \"tightness\" and orthopnea.  She is particularly SOA with minimal exertion.  She believes her last ECHO was 2-3 years ago with Dr. Rodriguez and does not remember any mention of heart failure.  She has not had chest pain, cough, fever, or syncope.      In addition, the patient fell about 4 days ago (she was getting out of bed and attempted to hold onto the chest of drawers but missed).  She says this fall has \"thrown her back out.\"  She is having pain gerson in her back, through her left hip and into her left leg.  She has been wheeling around the house in a wheelchair due to her inability to walk (due to pain, not weakness).  She does not have saddle anesthesia and has not lost control of her bowels/bladder.  She was in fact able to put her own pants on today so while she is still having pain and debility, she is a little better.  She has taken some tramadol for this pain.    Here in the ER, , WBC 11.64K, H/H 9.8/32.0, K 3.3, glc 159, creat 1.15, TSH 2.0, uric acid 8.4, ESR 39, .  CTA - no PE, some heart enlargement with small bilateral pleural effusions.    L spine MRI: Narrowing of the neural foramina bilaterally at the L3/L4  and L4/L5 level. Narrowing as well seen at the left neural " foramina at  the L2/L3 level. No definite nerve root compromise identified. Contact  cannot be excluded on the left at the L2/L3 level.    Review of Systems   Constitutional: Positive for activity change and fatigue. Negative for chills and fever.   HENT: Negative.    Eyes: Negative.    Respiratory: Positive for shortness of breath and wheezing.    Cardiovascular: Positive for leg swelling. Negative for chest pain and palpitations.   Gastrointestinal: Positive for abdominal distention. Negative for diarrhea, nausea and vomiting.   Endocrine: Negative.    Genitourinary: Negative.    Musculoskeletal:        Back pain that radiated to left leg   Skin: Negative.    Allergic/Immunologic: Negative.    Neurological: Negative.    Hematological: Negative.    Psychiatric/Behavioral: Negative.           Otherwise 10-system ROS reviewed and is negative except as mentioned in the HPI.    Personal History     Past Medical History:   Diagnosis Date   • Arthritis    • Atrial fibrillation (CMS/HCC)    • Cellulitis    • CHF (congestive heart failure) (CMS/HCC)    • Coronary artery disease    • Gout    • Hypertension        Past Surgical History:   Procedure Laterality Date   • ABDOMINAL SURGERY     • CYSTOSCOPY W/ LASER LITHOTRIPSY     • TUBAL ABDOMINAL LIGATION     • URETEROSCOPY STENT INSERTION         Family History: family history includes Heart disease in her father.     Social History:  reports that she has quit smoking. She does not have any smokeless tobacco history on file. She reports that she does not drink alcohol or use drugs.  Social History     Social History Narrative    Lives alone       Medications:  Prescriptions Prior to Admission   Medication Sig Dispense Refill Last Dose   • allopurinol (ZYLOPRIM) 100 MG tablet Take 100 mg by mouth 2 (Two) Times a Day.   7/9/2018 at Unknown time   • amLODIPine (NORVASC) 5 MG tablet Take 5 mg by mouth Daily.   7/9/2018 at Unknown time   • furosemide (LASIX) 20 MG tablet Take 40  mg by mouth Daily.   7/8/2018 at Unknown time   • metoprolol succinate XL (TOPROL-XL) 200 MG 24 hr tablet Take 200 mg by mouth Daily.   7/9/2018 at Unknown time   • traMADol-acetaminophen (ULTRACET) 37.5-325 MG per tablet Take 1 tablet by mouth Daily As Needed for Moderate Pain .   7/8/2018 at Unknown time   • warfarin (COUMADIN) 1 MG tablet Take 1 mg by mouth Daily.   7/9/2018 at Unknown time       No Known Allergies    Objective   Objective     Vital Signs:   Temp:  [98.1 °F (36.7 °C)-98.4 °F (36.9 °C)] 98.1 °F (36.7 °C)  Heart Rate:  [] 79  Resp:  [20-22] 22  BP: (111-186)/(50-84) 154/82        Physical Exam   Constitutional: Mild dyspnea, awake, alert  Eyes: PERRLA, sclerae anicteric, no conjunctival injection  HENT: NCAT, mucous membranes moist  Neck: Supple, no thyromegaly, no lymphadenopathy, trachea midline  Respiratory: Fine rales bases nonlabored respirations   Cardiovascular: irreg/irreg, no murmurs, rubs, or gallops, palpable pedal pulses bilaterally  Gastrointestinal: Positive bowel sounds, mild distension, obese  Musculoskeletal: 1-2+ bilateral ankle edema, no clubbing or cyanosis to extremities  Psychiatric: Appropriate affect, cooperative  Neurologic: Oriented x 3, strength symmetric in all extremities, Cranial Nerves grossly intact to confrontation, speech clear  Skin: No rashes      Results Reviewed:  I have personally reviewed current lab, radiology, and data and agree.      Results from last 7 days  Lab Units 07/09/18 0905 07/09/18  0904   WBC 10*3/mm3  --  11.64*   HEMOGLOBIN g/dL  --  9.8*   HEMATOCRIT %  --  32.0*   PLATELETS 10*3/mm3  --  243   INR  1.95*  --        Results from last 7 days  Lab Units 07/09/18 0905   SODIUM mmol/L 144   POTASSIUM mmol/L 3.3*   CHLORIDE mmol/L 108   CO2 mmol/L 26.0   BUN mg/dL 22   CREATININE mg/dL 1.15   GLUCOSE mg/dL 159*   CALCIUM mg/dL 8.3*   ALT (SGPT) U/L 9   AST (SGOT) U/L 19     Estimated Creatinine Clearance: 48.3 mL/min (by C-G formula based  on SCr of 1.15 mg/dL).  Brief Urine Lab Results     None        BNP   Date Value Ref Range Status   07/09/2018 420.0 (H) 0.0 - 100.0 pg/mL Final     Comment:     Results may be falsely decreased if patient taking Biotin.     Imaging Results (last 24 hours)     Procedure Component Value Units Date/Time    CT Angiogram Chest With Contrast [[id number]] Collected:  07/09/18 1412     Updated:  07/09/18 1452    Narrative:       EXAMINATION: CT ANGIOGRAM CHEST W CONTRAST- 07/09/2018     INDICATION: I50.9-Heart failure, unspecified; R09.02-Hypoxemia;  M60.869-Other myositis, unspecified lower leg; E79.0-Hyperuricemia  without signs of inflammatory arthritis and tophaceous disease;  R79.89-Other specified abnormal findings of blood chemistry; Z79.01-Long  term (current) use of anticoagulants; I48.2-Chronic atrial fibrillation;  M51.36-Other intervertebral disc degeneration, lumbar; shortness of  breath, leg swelling     TECHNIQUE: Multiple axial CT imaging was obtained of the chest following  the administration of intravenous contrast. Coronal 2-D reformatted  images were submitted to further facilitate diagnostic accuracy and  treatment planning.     The radiation dose reduction device was turned on for each scan per the  ALARA (As Low as Reasonably Achievable) protocol.     COMPARISON: NONE     FINDINGS: There are small bilateral pleural effusions left greater than  right. No filling defect seen of the pulmonary arteries to suggest  evidence of pulmonary embolism. The cardiac chambers are enlarged. There  is no pericardial effusion. No bulky hilar or axillary lymphadenopathy.  Vascular calcifications seen within the thoracic aorta. No pneumothorax.  No parenchymal consolidation, pulmonary mass or other nodule identified.  Degenerative changes seen within the spine. The visualized portions of  the upper abdomen are unremarkable. Degenerative changes seen within the  spine.       Impression:       Enlargement of the heart  with small bilateral pleural  effusions left greater than right. No evidence of filling defect to  suggest pulmonary embolism.  No acute parenchymal disease.     D:  07/09/2018  E:  07/09/2018        This report was finalized on 7/9/2018 2:50 PM by Dr. Dulce Maria Us MD.       MRI Lumbar Spine Without Contrast [[id number]] Collected:  07/09/18 1123     Updated:  07/09/18 1303    Narrative:       EXAMINATION: MRI LUMBAR SPINE WO CONTRAST-07/09/2018:     INDICATION: Bilateral leg weakness L>R, loss of ability to walk, low  back pain radiating into the left leg.     TECHNIQUE: Routine multiplanar imaging was obtained of the lumbar spine  without the administration of Gadolinium contrast.     COMPARISON: NONE.     FINDINGS: The vertebral body height is preserved with normal signal  intensity within the vertebral bodies. Anterior spurring and bridging  osteophyte formation seen from L1 through L5. There are degenerative  changes seen within the endplates inferiorly at the L3 and L4 levels.  Some disc desiccation seen at the L3/L4, L4/L5 and L5/S1 levels. There  is slight curvature of the spine with convexity to the right. Normal  signal intensity within the conus. The spinal cord terminates at the L1  level.     Axial imaging reveals evidence of a broad-based disc bulge at the L2/L3  level with some lateralization to the left creating narrowing of the  left neural foramina. Degenerative changes seen within the posterior  facets. Nerve root contact and compromise on the left cannot be  excluded. No significant central spinal canal stenosis.     At the L3/L4 level there is a broad-based disc bulge creating some  narrowing of the neural foramina bilaterally and moderate central spinal  canal stenosis. Degenerative changes seen within the posterior facets.  No significant nerve root compromise identified.     At the L4/L5 level there is a broad-based disc bulge in combination with  posterior facet hypertrophy creating  narrowing of the neural foramina  bilaterally and moderate central spinal canal stenosis. No significant  nerve root compromise.     At the L5/S1 level there is a broad-based disc bulge with no significant  central spinal canal stenosis or nerve root compromise. No  neuroforaminal narrowing identified.       Impression:       Narrowing of the neural foramina bilaterally at the L3/L4  and L4/L5 level. Narrowing as well seen at the left neural foramina at  the L2/L3 level. No definite nerve root compromise identified. Contact  cannot be excluded on the left at the L2/L3 level.     D:  07/09/2018  E:  07/09/2018            This report was finalized on 7/9/2018 1:01 PM by Dr. Dulce Maria Us MD.       XR Chest 1 View [[id number]] Collected:  07/09/18 1004     Updated:  07/09/18 1029    Narrative:       EXAMINATION: XR CHEST 1 VW-      INDICATION: Shortness of air triage protocol.      COMPARISON: None.     FINDINGS: Portable chest reveals heart to be enlarged. Underlying  chronic and emphysematous change is seen within the lung fields  bilaterally. Degenerative change is seen within the spine. Pulmonary  vascularity is within normal limits.  No pleural effusion or  pneumothorax. Pulmonary vascularity is prominent.           Impression:       Heart is enlarged with slight prominence of the pulmonary  vascularity with no acute parenchymal disease.     D:  07/09/2018  E:  07/09/2018     This report was finalized on 7/9/2018 10:27 AM by Dr. Dulce Maria Us MD.                Assessment/Plan   Assessment / Plan     Hospital Problem List     * (Principal)Acute congestive heart failure (CMS/HCC)    Chronic atrial fibrillation (CMS/HCC)    Hypokalemia    Gout (Chronic)    Hypertension (Chronic)    Elevated CK    Anemia    Leukocytosis    Warfarin anticoagulation (Chronic)    Hyperglycemia        Assessment & Plan:  Ms. Stewart is a 78 year old woman with PMH significant for HTN, chronic atrial fib (on warfarin and followed  by Dr. Rodriguez), and gout.  She presents to [facility] ER with acute heart failure and back pain    ECHO  20 mg lasix IV given in ER, will initiate 20 mg IV lasix BID for now and  hospitalist to check clinical response in AM  Continue bblocker  D/c norvasc and start lisinopril  TSH wnl  Will defer cardio consult for now, would suggest differentiating severity with  echo, diurese and follow up with Dr. Rodriguez    Pharmacy to dose warfarin  Atrial fib is rate controlled    K+ was replaced in ER, recheck in AM    Elevated CK - unsure etilogy - perhaps from her fall 4 days ago?  Or from  decreased activity?  Not on a statin and no muscle tenderness.    Anemia -- AM studies and guiac    Leukocytosis - Check UA    Hyperglycemia:  Check A1C    Acute back pain -- continue tramadol, add lidoderm patch, heat, PT/OT.  Consider F/U with neurosurgeon after d/c but has not had a trial of conservative management.      DVT prophylaxis:LMWH    CODE STATUS:    Code Status and Medical Interventions:   Ordered at: 07/09/18 1443     Level Of Support Discussed With:    Patient     Code Status:    CPR     Medical Interventions (Level of Support Prior to Arrest):    Full       INPATIENT status due to the need for care which can only be reasonably provided in an hospital setting such as aggressive/expedited ancillary services, the necessity for IV medications, close physician monitoring.  In such, I feel patient’s risk for adverse outcomes and need for care warrant INPATIENT evaluation and predict the patient’s care encounter to likely last beyond 2 midnights.    Electronically signed by Samantha Loera MD, 07/09/18, 2:45 PM.

## 2018-07-09 NOTE — PROGRESS NOTES
"Pharmacy Consult  -  Warfarin    Zee Stewart is a 78 year old woman with PMH significant for HTN, chronic atrial fib (on warfarin), and gout.  She presented to [facility] ER with acute heart failure and back pain on 7/9/18 and was admitted for further treatment.      Height - 170.2 cm (67\")  Weight - 94.3 kg (208 lb)    Consulting Provider: - Dr. Loera  Indication: - atrial fibrillation  Goal INR: - 2 to 3  Home Regimen:   - 1 mg po daily    Bridge Therapy: No, but enoxaparin 30 mg SQ daily has been ordered for VTE prophylaxis    Drug-Drug Interactions with current regimen:   See home medication list, none have been changed so far              Enoxaparin could increase bleeding risk even though is low dose.    Warfarin Dosing During Admission:    Date  7/9           INR  1.95           Dose  1 mg this AM at home              Education Provided:    Discharge Follow up:  Following Provider -  Follow up time range or appointment -    Labs:      Results from last 7 days     Lab Units 07/09/18  0905 07/09/18  0904   INR  1.95* --    HEMOGLOBIN g/dL --  9.8*   HEMATOCRIT % --  32.0*   PLATELETS 10*3/mm3 --  243       Results from last 7 days     Lab Units 07/09/18  0905   SODIUM mmol/L 144   POTASSIUM mmol/L 3.3*   CHLORIDE mmol/L 108   CO2 mmol/L 26.0   BUN mg/dL 22   CREATININE mg/dL 1.15   CALCIUM mg/dL 8.3*   BILIRUBIN mg/dL 1.1   ALK PHOS U/L 191*   ALT (SGPT) U/L 9   AST (SGOT) U/L 19   GLUCOSE mg/dL 159*     Assessment/Plan:     • Home regimen of warfarin 1 mg po daily is producing an INR that is close to goal.    However, impact of heart failure is difficult to predict.  Once cardiac output improved, INR may fall.  • Will recommend DC of enoxaparin as INR is likely sufficient to prevent VTE.  • Pharmacy Service will continue to follow the patient's clinical progress and monitor for evidence of warfarin-induced adverse effects.    Génesis Chambers, PharmD, BCPS           "

## 2018-07-10 PROBLEM — I07.1 TRICUSPID REGURGITATION: Status: ACTIVE | Noted: 2018-07-10

## 2018-07-10 PROBLEM — M54.9 BACK PAIN: Status: ACTIVE | Noted: 2018-07-10

## 2018-07-10 PROBLEM — I27.20 PULMONARY HYPERTENSION (HCC): Status: ACTIVE | Noted: 2018-07-10

## 2018-07-10 LAB
ALBUMIN SERPL-MCNC: 3.88 G/DL (ref 3.2–4.8)
ALBUMIN/GLOB SERPL: 1.6 G/DL (ref 1.5–2.5)
ALP SERPL-CCNC: 168 U/L (ref 25–100)
ALT SERPL W P-5'-P-CCNC: 8 U/L (ref 7–40)
ANION GAP SERPL CALCULATED.3IONS-SCNC: 3 MMOL/L (ref 3–11)
AST SERPL-CCNC: 18 U/L (ref 0–33)
BILIRUB SERPL-MCNC: 0.7 MG/DL (ref 0.3–1.2)
BUN BLD-MCNC: 23 MG/DL (ref 9–23)
BUN/CREAT SERPL: 20.4 (ref 7–25)
CALCIUM SPEC-SCNC: 8.3 MG/DL (ref 8.7–10.4)
CHLORIDE SERPL-SCNC: 113 MMOL/L (ref 99–109)
CK SERPL-CCNC: 223 U/L (ref 26–174)
CO2 SERPL-SCNC: 28 MMOL/L (ref 20–31)
CREAT BLD-MCNC: 1.13 MG/DL (ref 0.6–1.3)
DEPRECATED RDW RBC AUTO: 58.4 FL (ref 37–54)
ERYTHROCYTE [DISTWIDTH] IN BLOOD BY AUTOMATED COUNT: 19 % (ref 11.3–14.5)
FERRITIN SERPL-MCNC: 47 NG/ML (ref 10–291)
FOLATE SERPL-MCNC: 13.32 NG/ML (ref 3.2–20)
GFR SERPL CREATININE-BSD FRML MDRD: 47 ML/MIN/1.73
GLOBULIN UR ELPH-MCNC: 2.4 GM/DL
GLUCOSE BLD-MCNC: 139 MG/DL (ref 70–100)
HBA1C MFR BLD: 5.9 % (ref 4.8–5.6)
HCT VFR BLD AUTO: 30.9 % (ref 34.5–44)
HEMOCCULT STL QL: NEGATIVE
HGB BLD-MCNC: 9.3 G/DL (ref 11.5–15.5)
INR PPP: 1.98 (ref 0.91–1.09)
IRON 24H UR-MRATE: 14 MCG/DL (ref 50–175)
IRON SATN MFR SERPL: 4 % (ref 15–50)
MCH RBC QN AUTO: 25.8 PG (ref 27–31)
MCHC RBC AUTO-ENTMCNC: 30.1 G/DL (ref 32–36)
MCV RBC AUTO: 85.8 FL (ref 80–99)
PLATELET # BLD AUTO: 230 10*3/MM3 (ref 150–450)
PMV BLD AUTO: 11.6 FL (ref 6–12)
POTASSIUM BLD-SCNC: 4.3 MMOL/L (ref 3.5–5.5)
PROT SERPL-MCNC: 6.3 G/DL (ref 5.7–8.2)
PROTHROMBIN TIME: 20.8 SECONDS (ref 9.6–11.5)
RBC # BLD AUTO: 3.6 10*6/MM3 (ref 3.89–5.14)
SODIUM BLD-SCNC: 144 MMOL/L (ref 132–146)
TIBC SERPL-MCNC: 328 MCG/DL (ref 250–450)
TSH SERPL DL<=0.05 MIU/L-ACNC: 0.82 MIU/ML (ref 0.35–5.35)
VIT B12 BLD-MCNC: 498 PG/ML (ref 211–911)
WBC NRBC COR # BLD: 8.1 10*3/MM3 (ref 3.5–10.8)

## 2018-07-10 PROCEDURE — 82728 ASSAY OF FERRITIN: CPT | Performed by: FAMILY MEDICINE

## 2018-07-10 PROCEDURE — 94760 N-INVAS EAR/PLS OXIMETRY 1: CPT

## 2018-07-10 PROCEDURE — 97166 OT EVAL MOD COMPLEX 45 MIN: CPT

## 2018-07-10 PROCEDURE — 97161 PT EVAL LOW COMPLEX 20 MIN: CPT

## 2018-07-10 PROCEDURE — 84443 ASSAY THYROID STIM HORMONE: CPT | Performed by: FAMILY MEDICINE

## 2018-07-10 PROCEDURE — G8979 MOBILITY GOAL STATUS: HCPCS

## 2018-07-10 PROCEDURE — 94799 UNLISTED PULMONARY SVC/PX: CPT

## 2018-07-10 PROCEDURE — 85610 PROTHROMBIN TIME: CPT | Performed by: FAMILY MEDICINE

## 2018-07-10 PROCEDURE — 83550 IRON BINDING TEST: CPT | Performed by: FAMILY MEDICINE

## 2018-07-10 PROCEDURE — 80053 COMPREHEN METABOLIC PANEL: CPT | Performed by: FAMILY MEDICINE

## 2018-07-10 PROCEDURE — 96372 THER/PROPH/DIAG INJ SC/IM: CPT

## 2018-07-10 PROCEDURE — G8988 SELF CARE GOAL STATUS: HCPCS

## 2018-07-10 PROCEDURE — 97535 SELF CARE MNGMENT TRAINING: CPT

## 2018-07-10 PROCEDURE — 96376 TX/PRO/DX INJ SAME DRUG ADON: CPT

## 2018-07-10 PROCEDURE — 85027 COMPLETE CBC AUTOMATED: CPT | Performed by: FAMILY MEDICINE

## 2018-07-10 PROCEDURE — 82272 OCCULT BLD FECES 1-3 TESTS: CPT | Performed by: FAMILY MEDICINE

## 2018-07-10 PROCEDURE — 83036 HEMOGLOBIN GLYCOSYLATED A1C: CPT | Performed by: FAMILY MEDICINE

## 2018-07-10 PROCEDURE — 94640 AIRWAY INHALATION TREATMENT: CPT

## 2018-07-10 PROCEDURE — 99226 PR SBSQ OBSERVATION CARE/DAY 35 MINUTES: CPT | Performed by: HOSPITALIST

## 2018-07-10 PROCEDURE — 82746 ASSAY OF FOLIC ACID SERUM: CPT | Performed by: FAMILY MEDICINE

## 2018-07-10 PROCEDURE — 82550 ASSAY OF CK (CPK): CPT | Performed by: FAMILY MEDICINE

## 2018-07-10 PROCEDURE — G8978 MOBILITY CURRENT STATUS: HCPCS

## 2018-07-10 PROCEDURE — 82607 VITAMIN B-12: CPT | Performed by: FAMILY MEDICINE

## 2018-07-10 PROCEDURE — 25010000002 ENOXAPARIN PER 10 MG: Performed by: FAMILY MEDICINE

## 2018-07-10 PROCEDURE — 25010000002 FUROSEMIDE PER 20 MG: Performed by: FAMILY MEDICINE

## 2018-07-10 PROCEDURE — G8987 SELF CARE CURRENT STATUS: HCPCS

## 2018-07-10 PROCEDURE — G0378 HOSPITAL OBSERVATION PER HR: HCPCS

## 2018-07-10 PROCEDURE — 83540 ASSAY OF IRON: CPT | Performed by: FAMILY MEDICINE

## 2018-07-10 RX ADMIN — ALLOPURINOL 100 MG: 100 TABLET ORAL at 09:11

## 2018-07-10 RX ADMIN — ENOXAPARIN SODIUM 30 MG: 30 INJECTION SUBCUTANEOUS at 09:11

## 2018-07-10 RX ADMIN — WARFARIN SODIUM 1 MG: 1 TABLET ORAL at 17:17

## 2018-07-10 RX ADMIN — METOPROLOL SUCCINATE 200 MG: 50 TABLET, EXTENDED RELEASE ORAL at 09:11

## 2018-07-10 RX ADMIN — LISINOPRIL 5 MG: 5 TABLET ORAL at 09:11

## 2018-07-10 RX ADMIN — FUROSEMIDE 20 MG: 10 INJECTION, SOLUTION INTRAMUSCULAR; INTRAVENOUS at 09:11

## 2018-07-10 RX ADMIN — FUROSEMIDE 20 MG: 10 INJECTION, SOLUTION INTRAMUSCULAR; INTRAVENOUS at 20:45

## 2018-07-10 RX ADMIN — ALLOPURINOL 100 MG: 100 TABLET ORAL at 20:45

## 2018-07-10 RX ADMIN — IPRATROPIUM BROMIDE AND ALBUTEROL SULFATE 3 ML: 2.5; .5 SOLUTION RESPIRATORY (INHALATION) at 00:03

## 2018-07-10 NOTE — PLAN OF CARE
Problem: Patient Care Overview  Goal: Plan of Care Review  Outcome: Ongoing (interventions implemented as appropriate)   07/10/18 4641   Plan of Care Review   Progress (Evaluation)   OTHER   Outcome Summary OT eval complete. Pt presents with decreased functional endurance, weakness, decreased balance and pain with mobility which limits independence with self care. Pt issued sockaide and reacher and intiated ed for LBD. Pt CGA with bed mobility , min A clothing mgmt post toileting, min A to don L sock with AE. OT will follow to advance pt toward PLOF with self care. Recommend home with HHOT. upon d/c.

## 2018-07-10 NOTE — PLAN OF CARE
Problem: Patient Care Overview  Goal: Plan of Care Review  Outcome: Ongoing (interventions implemented as appropriate)    Goal: Individualization and Mutuality  Outcome: Ongoing (interventions implemented as appropriate)    Goal: Discharge Needs Assessment  Outcome: Ongoing (interventions implemented as appropriate)    Goal: Interprofessional Rounds/Family Conf  Outcome: Ongoing (interventions implemented as appropriate)      Problem: Cardiac: Heart Failure (Adult)  Goal: Signs and Symptoms of Listed Potential Problems Will be Absent, Minimized or Managed (Cardiac: Heart Failure)  Outcome: Ongoing (interventions implemented as appropriate)      Problem: Fall Risk (Adult)  Goal: Absence of Fall  Outcome: Ongoing (interventions implemented as appropriate)

## 2018-07-10 NOTE — THERAPY EVALUATION
Acute Care - Occupational Therapy Initial Evaluation  [facility]     Patient Name: Zee Stewart  : 1940  MRN: [id number]  Today's Date: 7/10/2018  Onset of Illness/Injury or Date of Surgery: 18  Date of Referral to OT: 18  Referring Physician: MD Evaristo    Admit Date: 2018       ICD-10-CM ICD-9-CM   1. Acute congestive heart failure, unspecified congestive heart failure type (CMS/HCC) I50.9 428.0   2. Hypoxia R09.02 799.02   3. Myositis of lower extremity, unspecified laterality, unspecified myositis type M60.869 729.1   4. Abnormal blood level of uric acid E79.0 790.6   5. Positive D dimer R79.89 790.92   6. Chronic anticoagulation Z79.01 V58.61   7. Chronic atrial fibrillation (CMS/HCC) I48.2 427.31   8. DDD (degenerative disc disease), lumbar M51.36 722.52   9. Deterioration in ability to walk R26.2 781.2   10. Hypokalemia E87.6 276.8   11. Impaired mobility and ADLs Z74.09 799.89     Patient Active Problem List   Diagnosis   • Chronic atrial fibrillation (CMS/HCC)   • Hypokalemia   • Gout   • Hypertension   • Elevated CK   • Acute congestive heart failure (CMS/HCC)   • Anemia   • Leukocytosis   • Warfarin anticoagulation   • Hyperglycemia     Past Medical History:   Diagnosis Date   • Arthritis    • Atrial fibrillation (CMS/HCC)    • Cellulitis    • CHF (congestive heart failure) (CMS/[facility])    • Coronary artery disease    • Gout    • Hypertension      Past Surgical History:   Procedure Laterality Date   • ABDOMINAL SURGERY     • CYSTOSCOPY W/ LASER LITHOTRIPSY     • TUBAL ABDOMINAL LIGATION     • URETEROSCOPY STENT INSERTION            OT ASSESSMENT FLOWSHEET (last 72 hours)      Occupational Therapy Evaluation     Row Name 07/10/18 0745                   OT Evaluation Time/Intention    Subjective Information complains of;weakness  -AC        Document Type evaluation  -AC        Mode of Treatment occupational therapy  -AC        Patient Effort good  -AC        Symptoms Noted During/After  Treatment increased pain  -AC           General Information    Patient Profile Reviewed? yes  -AC        Onset of Illness/Injury or Date of Surgery 07/09/18  -        Referring Physician MD Evaristo  -        Patient Observations alert;cooperative;agree to therapy  -        General Observations of Patient Pt received sidelying in bed sleeping.  Pt on O2  -AC        Prior Level of Function independent:;all household mobility;ADL's  -AC        Equipment Currently Used at Home cane, straight;cane, quad;walker, rolling;commode, bedside;shower chair   has borrowed w/c  -AC        Pertinent History of Current Functional Problem Pt admit with SOA, recent fall d/t LE weakness,  BLE edema, back pain radiating to LLE.  Diagnosed with CHF  -        Existing Precautions/Restrictions fall;oxygen therapy device and L/min  -AC        Limitations/Impairments hearing  -AC        Equipment Issued to Patient reacher;sock aid  -AC        Risks Reviewed patient:;LOB;increased discomfort;change in vital signs  -AC        Benefits Reviewed patient:;improve function;increase independence;increase strength;increase balance;increase knowledge  -AC        Barriers to Rehab none identified  -           Relationship/Environment    Primary Source of Support/Comfort child(henok)   daughter lives nearby  -AC        Lives With alone  -           Resource/Environmental Concerns    Current Living Arrangements home/apartment/condo  -AC           Home Main Entrance    Number of Stairs, Main Entrance one  -AC        Stair Railings, Main Entrance none  -AC           Stairs Within Home, Primary    Stairs, Within Home, Primary 12   to basement for laundry  -AC        Stair Railings, Within Home, Primary none  -AC           Cognitive Assessment/Intervention- PT/OT    Orientation Status (Cognition) oriented x 3  -AC        Follows Commands (Cognition) WFL  -AC           Bed Mobility Assessment/Treatment    Bed Mobility Assessment/Treatment supine-sit   -AC        Supine-Sit [place] (Bed Mobility) conditional independence  -        Assistive Device (Bed Mobility) bed rails  -        Comment (Bed Mobility) educated to logroll for comfort  -           Functional Mobility    Functional Mobility- Ind. Level verbal cues required;contact guard assist  -AC        Functional Mobility- Device rolling walker  -AC        Functional Mobility-Distance (Feet) 20  -AC           Transfer Assessment/Treatment    Transfer Assessment/Treatment sit-stand transfer;stand-sit transfer;toilet transfer  -        Comment (Transfers) VCs for hand placement  -           Sit-Stand Transfer    Sit-Stand [place] (Transfers) verbal cues;contact guard  -AC        Assistive Device (Sit-Stand Transfers) walker, front-wheeled  -AC           Stand-Sit Transfer    Stand-Sit [place] (Transfers) verbal cues;supervision  -        Assistive Device (Stand-Sit Transfers) walker, front-wheeled  -AC           Toilet Transfer    Type (Toilet Transfer) sit-stand;stand-sit  -AC        [place] Level (Toilet Transfer) contact guard  -        Assistive Device (Toilet Transfer) grab bars/safety frame   increased effort to stand from toilet  -           ADL Assessment/Intervention    00640 - OT Self Care/Mgmt Minutes 8  -AC        BADL Assessment/Intervention lower body dressing;toileting  -           Lower Body Dressing Assessment/Training    Lower Body Dressing [place] Level doff;don;socks  -        Assistive Devices (Lower Body Dressing) reacher;sock-aid  -        Lower Body Dressing Position unsupported standing  -        Comment (Lower Body Dressing) able to don right but not left, issued AE, and required Vcs to doff sock min A to don L   -           Toileting Assessment/Training    [place] Level (Toileting) toileting skills;perform perineal hygiene;adjust/manage clothing  -        Assistive Devices (Toileting) grab bar/safety frame  -        Toileting  Position supported standing;supported sitting  -AC        Comment (Toileting) inde with hygiene , min A clothing mgmt  -AC           BADL Safety/Performance    Impairments, BADL Safety/Performance balance;pain;range of motion  -AC        Skilled BADL Treatment/Intervention BADL process/adaptation training;adaptive equipment training  -AC           General ROM    GENERAL ROM COMMENTS BUE WFL  -AC           General Assessment (Manual Muscle Testing)    Comment, General Manual Muscle Testing (MMT) Assessment BUE 4/5  -AC           Sensory Assessment/Intervention    Sensory General Assessment --   BUE intact  -AC        Additional Documentation Hearing Assessment (Group);Vision Assessment/Intervention (Group)  -AC           Hearing Assessment    Hearing Status hearing impairment, bilaterally  -AC           Vision Assessment/Intervention    Visual Impairment/Limitations corrective lenses full time  -AC           Positioning and Restraints    Pre-Treatment Position in bed  -AC        Post Treatment Position chair  -AC        In Chair reclined;call light within reach;encouraged to call for assist;exit alarm on  -AC           Clinical Impression (OT)    Date of Referral to OT 07/09/18  -        OT Diagnosis ADL decline  -AC        Patient/Family Goals Statement (OT Eval) increase ADL independence  -AC        Criteria for Skilled Therapeutic Interventions Met (OT Eval) yes;treatment indicated  -AC        Rehab Potential (OT Eval) good, to achieve stated therapy goals  -AC        Therapy Frequency (OT Eval) daily  -AC        Care Plan Review (OT) care plan/treatment goals reviewed;risks/benefits reviewed  -AC        Anticipated Discharge Disposition (OT) home with home health  -AC           Vital Signs    Pre Systolic BP Rehab 126  -AC        Pre Treatment Diastolic BP 45  -AC        Posttreatment Heart Rate (beats/min) 79  -AC        Pre SpO2 (%) 95  -AC        O2 Delivery Pre Treatment supplemental O2  -AC        Intra  SpO2 (%) 88  -AC        O2 Delivery Intra Treatment room air  -AC        Post SpO2 (%) 97  -AC        O2 Delivery Post Treatment supplemental O2  -AC        Pre Patient Position Supine  -AC        Intra Patient Position Standing  -AC        Post Patient Position Sitting  -AC           Planned OT Interventions    Planned Therapy Interventions (OT Eval) BADL retraining;adaptive equipment training;strengthening exercise;transfer/mobility retraining;functional balance retraining  -AC           OT Goals    Transfer Goal Selection (OT) transfer, OT goal 1  -AC        Dressing Goal Selection (OT) dressing, OT goal 1  -AC        Strength Goal Selection (OT) strength, OT goal 1  -AC        Problem Specific Goal Selection (OT) problem specific goal 1, OT  -AC        Additional Documentation Problem Specific Goal Selection (OT) (Row);Strength Goal Selection (OT) (Row)  -AC           Transfer Goal 1 (OT)    Activity/Assistive Device (Transfer Goal 1, OT) bed-to-chair/chair-to-bed;toilet;walker, rolling  -AC        [place] Level/Cues Needed (Transfer Goal 1, OT) supervision required  -AC        Time Frame (Transfer Goal 1, OT) by discharge  -AC           Dressing Goal 1 (OT)    Activity/Assistive Device (Dressing Goal 1, OT) lower body dressing;reacher;sock-aid  -AC        [place]/Cues Needed (Dressing Goal 1, OT) supervision required  -AC        Time Frame (Dressing Goal 1, OT) by discharge  -AC           Strength Goal 1 (OT)    Strength Goal 1 (OT) Pt will complet 10-15 reps BUE AROM daily as needed to support ADLs  -AC        Time Frame (Strength Goal 1, OT) by discharge  -AC           Problem Specific Goal 1 (OT)    Problem Specific Goal 1 (OT) Pt will complete 10 min fnctiaonl activity with O2 sat 90% or greater  -AC        Time Frame (Problem Specific Goal 1, OT) by discharge  -AC           Living Environment    Home Accessibility stairs to enter home;stairs within home;tub/shower is not walk in  -AC           User Key  (r) = Recorded By, (t) = Taken By, (c) = Cosigned By    Initials Name Effective Dates    AC Nilda Smith OT 06/23/15 -            Occupational Therapy Education     Title: PT OT SLP Therapies (Active)     Topic: Occupational Therapy (Active)     Point: ADL training (Done)     Description: Instruct learner(s) on proper safety adaptation and remediation techniques during self care or transfers.   Instruct in proper use of assistive devices.   Learning Progress Summary     Learner Status Readiness Method Response Comment Documented by    Patient Done Acceptance E VU benefits of activity, role of OT  07/10/18 4521                      User Key     Initials Effective Dates Name Provider Type Discipline     06/23/15 -  Nilda Smith, OT Occupational Therapist OT                  OT Recommendation and Plan  Outcome Summary/Treatment Plan (OT)  Anticipated Discharge Disposition (OT): home with home health  Planned Therapy Interventions (OT Eval): BADL retraining, adaptive equipment training, strengthening exercise, transfer/mobility retraining, functional balance retraining  Therapy Frequency (OT Eval): daily  Outcome Summary: OT eval complete.  Pt presents with decreased functional endurance, weakness, decreased balance and pain with mobility which limits independence with self care.  Pt issued sockaide and reacher and intiated ed for LBD.  Pt CGA with bed mobility , min A clothing mgmt post toileting,  min A to don L sock with AE.  OT will follow to advance pt toward PLOF with self care.  Recommend home with HHOT. upon d/c.           Outcome Measures     Row Name 07/10/18 0745             How much help from another is currently needed...    Putting on and taking off regular lower body clothing? 2  -AC      Bathing (including washing, rinsing, and drying) 2  -AC      Toileting (which includes using toilet bed pan or urinal) 3  -AC      Putting on and taking off regular upper body clothing 3  -AC       Taking care of personal grooming (such as brushing teeth) 4  -AC      Eating meals 4  -AC      Score 18  -AC         Functional Assessment    Outcome Measure Options AM-PAC 6 Clicks Daily Activity (OT)  -AC        User Key  (r) = Recorded By, (t) = Taken By, (c) = Cosigned By    Initials Name Provider Type    AC Nilda Smith OT Occupational Therapist          Time Calculation:   OT Start Time: 0745  Therapy Suggested Charges     Code   Minutes Charges    62267 (CPT®) Hc Ot Neuromusc Re Education Ea 15 Min      56139 (CPT®) Hc Ot Ther Proc Ea 15 Min      60873 (CPT®) Hc Ot Therapeutic Act Ea 15 Min      35175 (CPT®) Hc Ot Manual Therapy Ea 15 Min      87650 (CPT®) Hc Ot Iontophoresis Ea 15 Min      70348 (CPT®) Hc Ot Elec Stim Ea-Per 15 Min      57167 (CPT®) Hc Ot Ultrasound Ea 15 Min      70548 (CPT®) Hc Ot Self Care/Mgmt/Train Ea 15 Min 8 1    Total  8 1        Therapy Charges for Today     Code Description Service Date Service Provider Modifiers Qty    04560107308 HC OT SELF CARE/MGMT/TRAIN EA 15 MIN 7/10/2018 Nilda Smith OT GO 1    11040382736 HC OT EVAL MOD COMPLEXITY 4 7/10/2018 Nilda Smith OT GO 1    50401750398 HC OT THER SUPP EA 15 MIN 7/10/2018 Nilda Smith OT GO 1               Nilda Smith OT  7/10/2018

## 2018-07-10 NOTE — PROGRESS NOTES
"Continued Stay Note   [place]     Patient Name: Zee Stewart  MRN: [id number]  Today's Date: 7/10/2018    Admit Date: 7/9/2018          Discharge Plan     Row Name 07/10/18 5375       Plan    Plan update    Patient/Family in Agreement with Plan yes    Plan Comments Spoke with Mrs. Stewart again and she states if she needs oxygen at time of discharge she wants to use [employer] as her family works for this company. CM discussed with nurse to assess need for oxygen at discharge. Patient states she was told by night nurse that she would need a sleep study. Patient would prefer to have sleep study done at home like her son has done in the past. CM asked Mrs. Stewart to inquire with her son as to which agency he used to provide that service and if MD agrees with sleep study, CM will assist in arranging.     Row Name 07/10/18 7438       Plan    Plan home with home health     Patient/Family in Agreement with Plan yes    Plan Comments CM met with Mrs. Stewart and her son, Wally/ELY at bedside. Mrs. Stewart lives alone in her ranch home with a basement in [facility] [place] [facility]. She does not have to access the basement on a routine basis. Her son lives one county away but is very involved in his mom's care. Mrs. Stewart is very independent typically using no equipment but recently she has been using a RW, BSC and shower chair that belonged to her late . She also drove herself wherever she needed to go. She has HMR with Rx drug coverage and has no issues in obtaining or affording her medications. She is on Coumadin and states she has her PT/INR labs completed monthly and her cardiologist's (Dr. Rodriguez) coumadin clinic manages her Coumadin. Son states he has ordered \"life alert\" for his mom and it will begin on Thursday, 7/`12. Patient and son open to having home health. Undecided at this time as to which agency they will use as patient is not sure if she will be going directly back to her home in [facility] or to her son's " home in [place]. CM discussed the HF clinic and both she and son are interested in connecting with this clinic. CM has made referral to the Clinic. If patient discharges to [facility], they would like to use [facility] for their telehealth option of services. CM will continue to follow. A list of HH agencies for both [place] and [facility] have been provided to patient and son.     Final Discharge Disposition Code 06 - home with home health care              Discharge Codes    No documentation.       Expected Discharge Date and Time     Expected Discharge Date Expected Discharge Time    Jul 12, 2018             Lyla Gay RN

## 2018-07-10 NOTE — PLAN OF CARE
Problem: Patient Care Overview  Goal: Plan of Care Review  Outcome: Ongoing (interventions implemented as appropriate)   07/10/18 2057   Coping/Psychosocial   Plan of Care Reviewed With patient   OTHER   Outcome Summary Patient presents with L lower extremity weakness in L123 pattern. Some buckling during ambulation requiring walker for safety. Will need continued skilled PT at d/c.

## 2018-07-10 NOTE — PROGRESS NOTES
"Pharmacy Consult  -  Warfarin    Zee Stewart is a 78 year old woman with PMH significant for HTN, chronic atrial fib (on warfarin), and gout.  She presented to [facility] ER with acute heart failure and back pain on 7/9/18 and was admitted for further treatment.    Height - 170 cm (66.93\")  Weight - 92.5 kg (204 lb)    Consulting Provider: - Dr. Loera  Indication: - atrial fibrillation  Goal INR: - 2 to 3  Home Regimen:              - 1 mg po daily     Bridge Therapy: No, but enoxaparin 30 mg SQ daily is being given for VTE prophylaxis     Drug-Drug Interactions with current regimen:              See home medication list, none have been changed so far              Enoxaparin could increase bleeding risk even though is low dose.     Warfarin Dosing During Admission:     Date  7/9  7/10                 INR  1.95  1.98                 Dose  1 mg this AM at home  1 mg                    Education Provided: pending     Discharge Follow up: with PCP  Following Provider - Dr. Rodriguez  Follow up time range or appointment - pending    Labs:      Results from last 7 days     Lab Units 07/10/18  0431 07/10/18  0430 07/09/18  1458 07/09/18  0905 07/09/18  0904   INR  1.98* --  1.96* 1.95* --    HEMOGLOBIN g/dL --  9.3* --  --  9.8*   HEMATOCRIT % --  30.9* --  --  32.0*   PLATELETS 10*3/mm3 --  230 --  --  243       Results from last 7 days     Lab Units 07/10/18  0430 07/09/18  0905   SODIUM mmol/L 144 144   POTASSIUM mmol/L 4.3 3.3*   CHLORIDE mmol/L 113* 108   CO2 mmol/L 28.0 26.0   BUN mg/dL 23 22   CREATININE mg/dL 1.13 1.15   CALCIUM mg/dL 8.3* 8.3*   BILIRUBIN mg/dL 0.7 1.1   ALK PHOS U/L 168* 191*   ALT (SGPT) U/L 8 9   AST (SGOT) U/L 18 19   GLUCOSE mg/dL 139* 159*   Current dietary intake: Regular, cardiac.  No documentation of intake amounts, but albumin 3.88 on admission and no nutritional flags noted.    Assessment/Plan:     • INR stable, patient due to receive warfarin 1 mg dose this evening.  • No new drug " interactions.  • No evidence of bleeding.  • Will provide warfarin pamphlet today and review with her.  • Continue home regimen of warfarin 1 mg po daily. Pharmacy Service will continue to follow the patient's clinical progress and monitor for evidence of warfarin-induced adverse effects.    Génesis Chambers, PharmD, BCPS

## 2018-07-10 NOTE — THERAPY EVALUATION
Acute Care - Physical Therapy Initial Evaluation  [facility]     Patient Name: Zee Stewart  : 1940  MRN: [id number]  Today's Date: 7/10/2018   Onset of Illness/Injury or Date of Surgery: 18  Date of Referral to PT: 18  Referring Physician: Dr Loera      Admit Date: 2018    Visit Dx:     ICD-10-CM ICD-9-CM   1. Acute congestive heart failure, unspecified congestive heart failure type (CMS/HCC) I50.9 428.0   2. Hypoxia R09.02 799.02   3. Myositis of lower extremity, unspecified laterality, unspecified myositis type M60.869 729.1   4. Abnormal blood level of uric acid E79.0 790.6   5. Positive D dimer R79.89 790.92   6. Chronic anticoagulation Z79.01 V58.61   7. Chronic atrial fibrillation (CMS/HCC) I48.2 427.31   8. DDD (degenerative disc disease), lumbar M51.36 722.52   9. Deterioration in ability to walk R26.2 781.2   10. Hypokalemia E87.6 276.8   11. Impaired mobility and ADLs Z74.09 799.89   12. Impaired functional mobility, balance, gait, and endurance Z74.09 V49.89     Patient Active Problem List   Diagnosis   • Chronic atrial fibrillation (CMS/HCC)   • Hypokalemia   • Gout   • Hypertension   • Elevated CK   • Acute diastolic congestive heart failure (CMS/HCC)   • Anemia   • Leukocytosis   • Warfarin anticoagulation   • Hyperglycemia   • Pulmonary hypertension   • Moderate to severe tricuspid regurgitation   • Back pain due to recent fall     Past Medical History:   Diagnosis Date   • Arthritis    • Atrial fibrillation (CMS/HCC)    • Cellulitis    • CHF (congestive heart failure) (CMS/[facility])    • Coronary artery disease    • Gout    • Hypertension      Past Surgical History:   Procedure Laterality Date   • ABDOMINAL SURGERY     • CYSTOSCOPY W/ LASER LITHOTRIPSY     • TUBAL ABDOMINAL LIGATION     • URETEROSCOPY STENT INSERTION          PT ASSESSMENT (last 12 hours)      Physical Therapy Evaluation     Row Name 07/10/18 1515          PT Evaluation Time/Intention    Subjective Information  complains of;dyspnea  -SC     Document Type evaluation  -SC     Mode of Treatment physical therapy  -SC     Patient Effort good  -SC     Symptoms Noted During/After Treatment other (see comments)   WHEEZING  -SC     Row Name 07/10/18 1515          General Information    Patient Profile Reviewed? yes  -SC     Onset of Illness/Injury or Date of Surgery 07/09/18  -SC     Referring Physician Dr Loera  -SC     Patient Observations alert;cooperative;agree to therapy  -SC     General Observations of Patient in chair  -SC     Prior Level of Function independent:;community mobility;gait;all household mobility;transfer;driving  -SC     Equipment Currently Used at Home cane, straight;cane, quad;walker, rolling;commode, bedside;shower chair   does not need walker to ambulate  -SC     Pertinent History of Current Functional Problem Admitted with soa, falls,back and L leg pain. dx with CHF, hypoxia, myositis  -SC     Existing Precautions/Restrictions fall;oxygen therapy device and L/min;other (see comments)   L leg buckling  -SC     Risks Reviewed patient:;increased discomfort  -SC     Benefits Reviewed patient:;improve function;increase independence;increase strength  -SC     Barriers to Rehab none identified  -SC     Row Name 07/10/18 1515          Relationship/Environment    Primary Source of Support/Comfort child(henok)  -SC     Lives With alone  -SC     Row Name 07/10/18 1515          Resource/Environmental Concerns    Current Living Arrangements home/apartment/condo  -SC     Row Name 07/10/18 1515          Home Main Entrance    Number of Stairs, Main Entrance one  -SC     Row Name 07/10/18 1515          Cognitive Assessment/Intervention- PT/OT    Orientation Status (Cognition) oriented x 4  -SC     Follows Commands (Cognition) WNL  -SC     Row Name 07/10/18 1515          Safety Issues, Functional Mobility    Impairments Affecting Function (Mobility) motor control;strength  -SC     Row Name 07/10/18 1515          Bed Mobility  Assessment/Treatment    Comment (Bed Mobility) uic  -[facility] Name 07/10/18 1515          Transfer Assessment/Treatment    Transfer Assessment/Treatment sit-stand transfer;stand-sit transfer  -SC     Comment (Transfers) cues for safety and hand placement  -SC     Sit-Stand [place] (Transfers) verbal cues;supervision  -SC     Stand-Sit [place] (Transfers) verbal cues;supervision  -SC     Row Name 07/10/18 1515          Sit-Stand Transfer    Assistive Device (Sit-Stand Transfers) walker, front-wheeled  -[facility] Name 07/10/18 1515          Stand-Sit Transfer    Assistive Device (Stand-Sit Transfers) walker, front-wheeled  -[facility] Name 07/10/18 1515          Gait/Stairs Assessment/Training    Gait/Stairs Assessment/Training gait/ambulation independence  -SC     [place] Level (Gait) contact guard;verbal cues  -SC     Assistive Device (Gait) walker, front-wheeled  -SC     Distance in Feet (Gait) 250   x3 standing rest, wheezinf noted at end of walk  -SC     Pattern (Gait) swing-through  -SC     Left Sided Gait Deviations knee buckling, left side  -SC     Comment (Gait/Stairs) cues to go slowly and stay close to walker  -SC     Row Name 07/10/18 1515          General ROM    GENERAL ROM COMMENTS wnl  -[facility] 07/10/18 1515          General Assessment (Manual Muscle Testing)    General Manual Muscle Testing (MMT) Assessment lower extremity strength deficits identified  -SC     Row Name 07/10/18 1515          Lower Extremity (Manual Muscle Testing)    Lower Extremity: Manual Muscle Testing (MMT) left hip strength deficit;left knee strength deficit  -SC     Row Name 07/10/18 1515          Left Hip (Manual Muscle Testing)    Left Hip Manual Muscle Testing (MMT) flexion  -SC     MMT: Flexion, Left Hip flexion  -SC     MMT, Gross Movement: Left Hip Flexion (4-/5) good minus  -[facility] Name 07/10/18 1515          Left Knee (Manual Muscle Testing)    Left Knee Manual Muscle Testing (MMT) extension   -SC     MMT: Extension, Left Knee extension  -SC     MMT, Gross Movement: Left Knee Extension (3+/5) fair plus  -[facility] Name 07/10/18 1515          Motor Assessment/Intervention    Additional Documentation Balance (Group);Therapeutic Exercise (Group);Therapeutic Exercise Interventions (Group)  -SC     Row Name 07/10/18 1515          Therapeutic Exercise    Lower Extremity (Therapeutic Exercise) LAQ (long arc quad), bilateral;marching while seated  -SC     Exercise Type (Therapeutic Exercise) AROM (active range of motion)  -SC     Position (Therapeutic Exercise) seated  -SC     Sets/Reps (Therapeutic Exercise) 5-10  -SC     Row Name 07/10/18 1515          Balance    Balance dynamic standing balance  -SC     Row Name 07/10/18 1515          Dynamic Standing Balance    Level of [place], Reaches Outside Midline (Standing, Dynamic Balance) contact guard assist   walker  -SC     Row Name 07/10/18 1515          Sensory Assessment/Intervention    Sensory General Assessment no sensation deficits identified  -SC     Row Name 07/10/18 1515          Pain Assessment    Additional Documentation Pain Scale: Numbers Pre/Post-Treatment (Group)  -SC     Row Name 07/10/18 1515          Pain Scale: Numbers Pre/Post-Treatment    Pain Scale: Numbers, Pretreatment 3/10  -SC     Pain Scale: Numbers, Post-Treatment 3/10  -SC     Pain Location - Side Left  -SC     Pain Location back  -SC     Pre/Post Treatment Pain Comment --   radiates to groin  -SC     Row Name 07/10/18 1515          Coping    Observed Emotional State calm;cooperative  -SC     Verbalized Emotional State acceptance  -SC     Row Name 07/10/18 1515          Plan of Care Review    Plan of Care Reviewed With patient  -SC     Row Name 07/10/18 1515          Physical Therapy Clinical Impression    Date of Referral to PT 07/09/18  -SC     PT Diagnosis (PT Clinical Impression) impaired mobility  -SC     Criteria for Skilled Interventions Met (PT Clinical Impression)  yes;treatment indicated  -SC     Pathology/Pathophysiology Noted (Describe Specifically for Each System) musculoskeletal;neuromuscular  -SC     Impairments Found (describe specific impairments) gait, locomotion, and balance;motor function;muscle performance  -SC     Rehab Potential (PT Clinical Summary) good, to achieve stated therapy goals  -SC     Care Plan Review (PT) care plan/treatment goals reviewed;risks/benefits reviewed;evaluation/treatment results reviewed  -[facility] Name 07/10/18 1515          Vital Signs    Pre SpO2 (%) 95  -SC     O2 Delivery Pre Treatment supplemental O2  -SC     Intra SpO2 (%) 95  -SC     O2 Delivery Intra Treatment supplemental O2  -SC     Post SpO2 (%) 95  -SC     O2 Delivery Post Treatment supplemental O2  -SC     Row Name 07/10/18 1515          Physical Therapy Goals    Bed Mobility Goal Selection (PT) bed mobility, PT goal 1  -SC     Transfer Goal Selection (PT) transfer, PT goal 1  -SC     Gait Training Goal Selection (PT) gait training, PT goal 1  -[facility] Name 07/10/18 1515          Bed Mobility Goal 1 (PT)    Activity/Assistive Device (Bed Mobility Goal 1, PT) supine to sit  -SC     [place] Level/Cues Needed (Bed Mobility Goal 1, PT) independent  -SC     Time Frame (Bed Mobility Goal 1, PT) long term goal (LTG);10 days  -[facility] Name 07/10/18 1515          Transfer Goal 1 (PT)    Activity/Assistive Device (Transfer Goal 1, PT) sit-to-stand/stand-to-sit  -SC     [place] Level/Cues Needed (Transfer Goal 1, PT) conditional independence  -SC     Time Frame (Transfer Goal 1, PT) long term goal (LTG);10 days  -[facility] Name 07/10/18 1515          Gait Training Goal 1 (PT)    Activity/Assistive Device (Gait Training Goal 1, PT) gait (walking locomotion);walker, rolling  -SC     [place] Level (Gait Training Goal 1, PT) conditional independence  -SC     Distance (Gait Goal 1, PT) 350  -SC     Time Frame (Gait Training Goal 1, PT) long term goal (LTG);10 days   -SC     Row Name 07/10/18 1515          Positioning and Restraints    Pre-Treatment Position sitting in chair/recliner  -SC     Post Treatment Position chair  -SC     In Chair sitting;call light within reach;encouraged to call for assist;exit alarm on  -SC     Row Name 07/10/18 1515          Living Environment    Home Accessibility stairs to enter home  -SC       User Key  (r) = Recorded By, (t) = Taken By, (c) = Cosigned By    Initials Name Provider Type    SC Hilda Naidu, PT Physical Therapist          Physical Therapy Education     Title: PT OT SLP Therapies (Active)     Topic: Physical Therapy (Active)     Point: Mobility training (Active)    Learning Progress Summary     Learner Status Readiness Method Response Comment Documented by    Patient Active Eager E NR reviewed safety with mobiltiy SC 07/10/18 1708          Point: Home exercise program (Active)    Learning Progress Summary     Learner Status Readiness Method Response Comment Documented by    Patient Active Eager E NR reviewed safety with mobiltiy SC 07/10/18 1708          Point: Body mechanics (Active)    Learning Progress Summary     Learner Status Readiness Method Response Comment Documented by    Patient Active Eager E NR reviewed safety with mobiltiy SC 07/10/18 1708          Point: Precautions (Active)    Learning Progress Summary     Learner Status Readiness Method Response Comment Documented by    Patient Active Eager E NR reviewed safety with mobiltiy SC 07/10/18 1708                      User Key     Initials Effective Dates Name Provider Type [facility] 06/19/15 -  Hilda Naidu, PT Physical Therapist PT                PT Recommendation and Plan  Anticipated Discharge Disposition (PT): home with home health  Planned Therapy Interventions (PT Eval): bed mobility training, gait training, home exercise program, patient/family education, postural re-education, strengthening, transfer training  Therapy Frequency (PT Clinical Impression):  daily  Outcome Summary/Treatment Plan (PT)  Anticipated Discharge Disposition (PT): home with home health  Plan of Care Reviewed With: patient  Outcome Summary: Patient presents with L lower extremity weakness in L123 pattern. Some buckling during ambulation requireing walker for safety.  WIll need continued skilled PT at d/c.          Outcome Measures     Row Name 07/10/18 1515 07/10/18 0758          How much help from another person do you currently need...    Turning from your back to your side while in flat bed without using bedrails? 4  -SC  --     Moving from lying on back to sitting on the side of a flat bed without bedrails? 3  -SC  --     Moving to and from a bed to a chair (including a wheelchair)? 3  -SC  --     Standing up from a chair using your arms (e.g., wheelchair, bedside chair)? 3  -SC  --     Climbing 3-5 steps with a railing? 3  -SC  --     To walk in hospital room? 3  -SC  --     AM-PAC 6 Clicks Score 19  -SC  --        How much help from another is currently needed...    Putting on and taking off regular lower body clothing?  -- 2  -AC     Bathing (including washing, rinsing, and drying)  -- 2  -AC     Toileting (which includes using toilet bed pan or urinal)  -- 3  -AC     Putting on and taking off regular upper body clothing  -- 3  -AC     Taking care of personal grooming (such as brushing teeth)  -- 4  -AC     Eating meals  -- 4  -AC     Score  -- 18  -AC        Functional Assessment    Outcome Measure Options AM-PAC 6 Clicks Basic Mobility (PT)  -SC AM-PAC 6 Clicks Daily Activity (OT)  -AC       User Key  (r) = Recorded By, (t) = Taken By, (c) = Cosigned By    Initials Name Provider Type    SHARIFA Naidu, PT Physical Therapist    AC Nilda Smith, OT Occupational Therapist           Time Calculation:         PT Charges     Row Name 07/10/18 0590             Time Calculation    Start Time 1515  -SC      PT Received On 07/10/18  -SC      PT Goal Re-Cert Due Date 07/20/18  -SC        User  Key  (r) = Recorded By, (t) = Taken By, (c) = Cosigned By    Initials Name Provider Type    SC Shearon A Elysia, PT Physical Therapist        Therapy Suggested Charges     Code   Minutes Charges    None           Therapy Charges for Today     Code Description Service Date Service Provider Modifiers Qty    17217211487 HC PT MOBILITY CURRENT 7/10/2018 Shearon A Elysia, PT GP, CJ 1    26206295550 HC PT MOBILITY PROJECTED 7/10/2018 Shearon A Elysia, PT GP, CI 1    94596469347 HC PT EVAL LOW COMPLEXITY 4 7/10/2018 Shearon A Elysia, PT GP 1    55944801924 HC PT THER SUPP EA 15 MIN 7/10/2018 Shearon A Elysia, PT GP 2          PT G-Codes  Outcome Measure Options: AM-PAC 6 Clicks Basic Mobility (PT)  Score: 19  Functional Limitation: Mobility: Walking and moving around  Mobility: Walking and Moving Around Current Status (): At least 20 percent but less than 40 percent impaired, limited or restricted  Mobility: Walking and Moving Around Goal Status (): At least 1 percent but less than 20 percent impaired, limited or restricted      Hilda AJian Naidu, PT  7/10/2018

## 2018-07-10 NOTE — PROGRESS NOTES
[facility] Medicine Services  PROGRESS NOTE    Patient Name: Zee Stewart  : 1940  MRN: [id number]    Date of Admission: 2018  Length of Stay: 1  Primary Care Physician: Raymond Becker MD    Subjective   Subjective     CC:  F/U SOA, back pain    HPI:  Patient seen this morning. Son just arrived. She states her breathing is better, as well as her back and leg pain. She was able to walk with a walker this morning with therapy.     Review of Systems  Gen-no fevers, no chills  CV-no chest pain, no palpitations  Resp-no cough, improved dyspnea  GI-no N/V/D, no abd pain    Otherwise ROS is negative except as mentioned in the HPI.    Objective   Objective     Vital Signs:   Temp:  [98 °F (36.7 °C)-98.4 °F (36.9 °C)] 98 °F (36.7 °C)  Heart Rate:  [72-91] 91  Resp:  [18-22] 18  BP: (115-160)/(45-82) 126/45        Physical Exam:  Gen-no acute distress  CV-irregular, S1 S2 normal, no m/r/g  Resp-decreased at the bases bilaterally, mild expiratory wheezes  Abd-soft, NT, ND, +BS  Ext-1-2+ BLE edema  Neuro-A&Ox3, no focal deficits  Psych-appropriate mood      Results Reviewed:  I have personally reviewed current lab, radiology, and data and agree.      Results from last 7 days  Lab Units 07/10/18  0431 07/10/18  0430 18  1458 18  0905 18  0904   WBC 10*3/mm3  --  8.10  --   --  11.64*   HEMOGLOBIN g/dL  --  9.3*  --   --  9.8*   HEMATOCRIT %  --  30.9*  --   --  32.0*   PLATELETS 10*3/mm3  --  230  --   --  243   INR  1.98*  --  1.96* 1.95*  --        Results from last 7 days  Lab Units 07/10/18  0430 18  0905   SODIUM mmol/L 144 144   POTASSIUM mmol/L 4.3 3.3*   CHLORIDE mmol/L 113* 108   CO2 mmol/L 28.0 26.0   BUN mg/dL 23 22   CREATININE mg/dL 1.13 1.15   GLUCOSE mg/dL 139* 159*   CALCIUM mg/dL 8.3* 8.3*   ALT (SGPT) U/L 8 9   AST (SGOT) U/L 18 19     Estimated Creatinine Clearance: 48.6 mL/min (by C-G formula based on SCr of 1.13 mg/dL).  BNP   Date Value Ref  Range Status   07/09/2018 420.0 (H) 0.0 - 100.0 pg/mL Final     Comment:     Results may be falsely decreased if patient taking Biotin.       Microbiology Results Abnormal     None          Imaging Results (last 24 hours)     Procedure Component Value Units Date/Time    CT Angiogram Chest With Contrast [[id number]] Collected:  07/09/18 1412     Updated:  07/09/18 1452    Narrative:       EXAMINATION: CT ANGIOGRAM CHEST W CONTRAST- 07/09/2018     INDICATION: I50.9-Heart failure, unspecified; R09.02-Hypoxemia;  M60.869-Other myositis, unspecified lower leg; E79.0-Hyperuricemia  without signs of inflammatory arthritis and tophaceous disease;  R79.89-Other specified abnormal findings of blood chemistry; Z79.01-Long  term (current) use of anticoagulants; I48.2-Chronic atrial fibrillation;  M51.36-Other intervertebral disc degeneration, lumbar; shortness of  breath, leg swelling     TECHNIQUE: Multiple axial CT imaging was obtained of the chest following  the administration of intravenous contrast. Coronal 2-D reformatted  images were submitted to further facilitate diagnostic accuracy and  treatment planning.     The radiation dose reduction device was turned on for each scan per the  ALARA (As Low as Reasonably Achievable) protocol.     COMPARISON: NONE     FINDINGS: There are small bilateral pleural effusions left greater than  right. No filling defect seen of the pulmonary arteries to suggest  evidence of pulmonary embolism. The cardiac chambers are enlarged. There  is no pericardial effusion. No bulky hilar or axillary lymphadenopathy.  Vascular calcifications seen within the thoracic aorta. No pneumothorax.  No parenchymal consolidation, pulmonary mass or other nodule identified.  Degenerative changes seen within the spine. The visualized portions of  the upper abdomen are unremarkable. Degenerative changes seen within the  spine.       Impression:       Enlargement of the heart with small bilateral  pleural  effusions left greater than right. No evidence of filling defect to  suggest pulmonary embolism.  No acute parenchymal disease.     D:  07/09/2018  E:  07/09/2018        This report was finalized on 7/9/2018 2:50 PM by Dr. Dulce Maria Us MD.       MRI Lumbar Spine Without Contrast [[id number]] Collected:  07/09/18 1123     Updated:  07/09/18 1303    Narrative:       EXAMINATION: MRI LUMBAR SPINE WO CONTRAST-07/09/2018:     INDICATION: Bilateral leg weakness L>R, loss of ability to walk, low  back pain radiating into the left leg.     TECHNIQUE: Routine multiplanar imaging was obtained of the lumbar spine  without the administration of Gadolinium contrast.     COMPARISON: NONE.     FINDINGS: The vertebral body height is preserved with normal signal  intensity within the vertebral bodies. Anterior spurring and bridging  osteophyte formation seen from L1 through L5. There are degenerative  changes seen within the endplates inferiorly at the L3 and L4 levels.  Some disc desiccation seen at the L3/L4, L4/L5 and L5/S1 levels. There  is slight curvature of the spine with convexity to the right. Normal  signal intensity within the conus. The spinal cord terminates at the L1  level.     Axial imaging reveals evidence of a broad-based disc bulge at the L2/L3  level with some lateralization to the left creating narrowing of the  left neural foramina. Degenerative changes seen within the posterior  facets. Nerve root contact and compromise on the left cannot be  excluded. No significant central spinal canal stenosis.     At the L3/L4 level there is a broad-based disc bulge creating some  narrowing of the neural foramina bilaterally and moderate central spinal  canal stenosis. Degenerative changes seen within the posterior facets.  No significant nerve root compromise identified.     At the L4/L5 level there is a broad-based disc bulge in combination with  posterior facet hypertrophy creating narrowing of the neural  foramina  bilaterally and moderate central spinal canal stenosis. No significant  nerve root compromise.     At the L5/S1 level there is a broad-based disc bulge with no significant  central spinal canal stenosis or nerve root compromise. No  neuroforaminal narrowing identified.       Impression:       Narrowing of the neural foramina bilaterally at the L3/L4  and L4/L5 level. Narrowing as well seen at the left neural foramina at  the L2/L3 level. No definite nerve root compromise identified. Contact  cannot be excluded on the left at the L2/L3 level.     D:  07/09/2018  E:  07/09/2018            This report was finalized on 7/9/2018 1:01 PM by Dr. Dulce Maria Us MD.           Results for orders placed during the hospital encounter of 07/09/18   Adult Transthoracic Echo Complete W/ Cont if Necessary Per Protocol    Narrative · Moderate pulmonic valve regurgitation is present.  · Left atrial cavity size is mild-to-moderately dilated.  · Mild mitral valve regurgitation is present.  · Moderate to severe tricuspid valve regurgitation is present.  · Calculated right ventricular systolic pressure from tricuspid   regurgitation is 66 mmHg.  · Right ventricular cavity is mild-to-moderately dilated.  · Moderate aortic valve regurgitation is present.  · Left ventricular systolic function is normal. Estimated EF = 70%.  · There is no evidence of pericardial effusion.  · Moderate to severe pulmonary hypertension is present.  · Mild MAC is present.  · The aortic valve exhibits sclerosis.          I have reviewed the medications.    Assessment/Plan   Assessment / Plan     Hospital Problem List     * (Principal)Acute diastolic congestive heart failure (CMS/HCC)    Chronic atrial fibrillation (CMS/HCC)    Hypokalemia    Gout (Chronic)    Hypertension (Chronic)    Elevated CK    Anemia    Leukocytosis    Warfarin anticoagulation (Chronic)    Hyperglycemia    Pulmonary hypertension    Moderate to severe tricuspid regurgitation     Back pain due to recent fall             Brief Hospital Course to date:  Zee Stewart is a 78 y.o. female with hx of HTN, chronic Afib on Coumadin (followed by Dr. Rodriguez), and gout presents due to increased SOA and BLE edema. She also had a recent fall and has had back pain and difficulty walking. In the ER, labs showed  and CTA chest showed cardiac enlargement and bilateral pleural effusions, no PE. Admitted to hospitalists for heart failure management.       Assessment & Plan:  --Continue diuresis with 20 mg IV Lasix BID. Strict I&O and daily weights.   --Echo reviewed. Shows normal EF but moderate to severe TR and pulmonary HTN. I wonder also if she has untreated KATIA (nursing staff has noted nocturnal apnea and desaturation). Recommend outpatient sleep study after discharge as well as close follow up with Dr. Rodriguez.   --MRI L spine shows some narrowing but no severe stenosis or acute fractures. Continue PT/OT and supportive care.   --Discussed at length with patient and her son.     DVT Prophylaxis:  Coumadin    CODE STATUS:   Code Status and Medical Interventions:   Ordered at: 07/09/18 1443     Level Of Support Discussed With:    Patient     Code Status:    CPR     Medical Interventions (Level of Support Prior to Arrest):    Full       Disposition: I expect the patient to be discharged home in ~1-2 days    Electronically signed by Sejal Salazar MD, 07/10/18, 1:09 PM.

## 2018-07-10 NOTE — PROGRESS NOTES
Discharge Planning Assessment  [facility]     Patient Name: Zee Stewart  MRN: [id number]  Today's Date: 7/10/2018    Admit Date: 7/9/2018          Discharge Needs Assessment     Row Name 07/10/18 1404       Living Environment    Lives With alone    Current Living Arrangements home/apartment/condo    Primary Care Provided by self    Provides Primary Care For no one    Family Caregiver if Needed none    Quality of Family Relationships helpful;involved;supportive    Able to Return to Prior Arrangements yes       Resource/Environmental Concerns    Resource/Environmental Concerns none    Transportation Concerns car, none       Transition Planning    Patient/Family Anticipates Transition to home       Discharge Needs Assessment    Readmission Within the Last 30 Days no previous admission in last 30 days    Concerns to be Addressed discharge planning    Equipment Currently Used at Home bath bench;walker, rolling;shower chair;other (see comments)   life alert ordered by son, to be activated Thursday, 7/12    Anticipated Changes Related to Illness none    Equipment Needed After Discharge none    Discharge Facility/Level of Care Needs home with home health    Offered/Gave Vendor List yes            Discharge Plan     Row Name 07/10/18 1406       Plan    Plan home with home health     Patient/Family in Agreement with Plan yes    Plan Comments CM met with Mrs. Stewart and her son, Wally/ELY at bedside. Mrs. Stewart lives alone in her ranch home with a basement in [place]. She does not have to access the basement on a routine basis. Her son lives one county away but is very involved in his mom's care. Mrs. Stewart is very independent typically using no equipment but recently she has been using a RW, BSC and shower chair that belonged to her late . She also drove herself wherever she needed to go. She has HMR with Rx drug coverage and has no issues in obtaining or affording her medications. She is on Coumadin and states she  "has her PT/INR labs completed monthly and her cardiologist's (Dr. Rodriguez) coumadin clinic manages her Coumadin. Son states he has ordered \"life alert\" for his mom and it will begin on Thursday, 7/`12. Patient and son open to having home health. Undecided at this time as to which agency they will use as patient is not sure if she will be going directly back to her home in [place] or to her son's home in [place]. CM discussed the HF clinic and both she and son are interested in connecting with this clinic. CM has made referral to the Clinic. If patient discharges to [facility], they would like to use [facility], [place] for their telehealth option of services. CM will continue to follow. A list of HH agencies for both [place] [facility] have been provided to patient and son.     Final Discharge Disposition Code 06 - home with home health care        Destination     No service coordination in this encounter.      Durable Medical Equipment     No service coordination in this encounter.      Dialysis/Infusion     No service coordination in this encounter.      Home Medical Care     No service coordination in this encounter.      Social Care     No service coordination in this encounter.        Expected Discharge Date and Time     Expected Discharge Date Expected Discharge Time    Jul 12, 2018               Demographic Summary     Row Name 07/10/18 1404       General Information    Admission Type inpatient    Arrived From home    Referral Source admission list    Preferred Language English       Contact Information    Permission Granted to Share Info With             Functional Status     Row Name 07/10/18 1404       Functional Status    Usual Activity Tolerance moderate    Current Activity Tolerance fair       Functional Status, IADL    Medications independent    Meal Preparation independent    Housekeeping independent    Laundry independent    Shopping independent       Mental Status    " General Appearance WDL WDL       Mental Status Summary    Recent Changes in Mental Status/Cognitive Functioning no changes            Psychosocial    No documentation.           Abuse/Neglect    No documentation.           Legal    No documentation.           Substance Abuse    No documentation.           Patient Forms    No documentation.         Lyla Gay RN

## 2018-07-11 LAB
ANION GAP SERPL CALCULATED.3IONS-SCNC: 9 MMOL/L (ref 3–11)
BNP SERPL-MCNC: 256 PG/ML (ref 0–100)
BUN BLD-MCNC: 36 MG/DL (ref 9–23)
BUN/CREAT SERPL: 29.8 (ref 7–25)
CALCIUM SPEC-SCNC: 7.7 MG/DL (ref 8.7–10.4)
CHLORIDE SERPL-SCNC: 107 MMOL/L (ref 99–109)
CO2 SERPL-SCNC: 25 MMOL/L (ref 20–31)
CREAT BLD-MCNC: 1.21 MG/DL (ref 0.6–1.3)
DEPRECATED RDW RBC AUTO: 57.7 FL (ref 37–54)
ERYTHROCYTE [DISTWIDTH] IN BLOOD BY AUTOMATED COUNT: 18.7 % (ref 11.3–14.5)
GFR SERPL CREATININE-BSD FRML MDRD: 43 ML/MIN/1.73
GLUCOSE BLD-MCNC: 108 MG/DL (ref 70–100)
HCT VFR BLD AUTO: 28.4 % (ref 34.5–44)
HGB BLD-MCNC: 8.8 G/DL (ref 11.5–15.5)
INR PPP: 2.36 (ref 0.91–1.09)
MCH RBC QN AUTO: 26.3 PG (ref 27–31)
MCHC RBC AUTO-ENTMCNC: 31 G/DL (ref 32–36)
MCV RBC AUTO: 84.8 FL (ref 80–99)
PLATELET # BLD AUTO: 245 10*3/MM3 (ref 150–450)
PMV BLD AUTO: 11.1 FL (ref 6–12)
POTASSIUM BLD-SCNC: 4 MMOL/L (ref 3.5–5.5)
PROTHROMBIN TIME: 24.8 SECONDS (ref 9.6–11.5)
RBC # BLD AUTO: 3.35 10*6/MM3 (ref 3.89–5.14)
SODIUM BLD-SCNC: 141 MMOL/L (ref 132–146)
WBC NRBC COR # BLD: 11.89 10*3/MM3 (ref 3.5–10.8)

## 2018-07-11 PROCEDURE — 85610 PROTHROMBIN TIME: CPT | Performed by: FAMILY MEDICINE

## 2018-07-11 PROCEDURE — 25010000002 PROMETHAZINE PER 50 MG: Performed by: FAMILY MEDICINE

## 2018-07-11 PROCEDURE — 83880 ASSAY OF NATRIURETIC PEPTIDE: CPT | Performed by: HOSPITALIST

## 2018-07-11 PROCEDURE — 80048 BASIC METABOLIC PNL TOTAL CA: CPT | Performed by: HOSPITALIST

## 2018-07-11 PROCEDURE — 25010000002 FUROSEMIDE PER 20 MG: Performed by: FAMILY MEDICINE

## 2018-07-11 PROCEDURE — 96376 TX/PRO/DX INJ SAME DRUG ADON: CPT

## 2018-07-11 PROCEDURE — 85027 COMPLETE CBC AUTOMATED: CPT | Performed by: HOSPITALIST

## 2018-07-11 PROCEDURE — G0378 HOSPITAL OBSERVATION PER HR: HCPCS

## 2018-07-11 PROCEDURE — 97116 GAIT TRAINING THERAPY: CPT

## 2018-07-11 PROCEDURE — 99226 PR SBSQ OBSERVATION CARE/DAY 35 MINUTES: CPT | Performed by: HOSPITALIST

## 2018-07-11 PROCEDURE — 96375 TX/PRO/DX INJ NEW DRUG ADDON: CPT

## 2018-07-11 PROCEDURE — 97110 THERAPEUTIC EXERCISES: CPT

## 2018-07-11 PROCEDURE — 25010000002 ENOXAPARIN PER 10 MG: Performed by: FAMILY MEDICINE

## 2018-07-11 RX ADMIN — FUROSEMIDE 20 MG: 10 INJECTION, SOLUTION INTRAMUSCULAR; INTRAVENOUS at 08:54

## 2018-07-11 RX ADMIN — METOPROLOL SUCCINATE 200 MG: 50 TABLET, EXTENDED RELEASE ORAL at 08:54

## 2018-07-11 RX ADMIN — WARFARIN SODIUM 1 MG: 1 TABLET ORAL at 17:14

## 2018-07-11 RX ADMIN — LISINOPRIL 5 MG: 5 TABLET ORAL at 08:54

## 2018-07-11 RX ADMIN — FUROSEMIDE 20 MG: 10 INJECTION, SOLUTION INTRAMUSCULAR; INTRAVENOUS at 20:36

## 2018-07-11 RX ADMIN — PROMETHAZINE HYDROCHLORIDE 12.5 MG: 25 INJECTION INTRAMUSCULAR; INTRAVENOUS at 20:36

## 2018-07-11 RX ADMIN — ALLOPURINOL 100 MG: 100 TABLET ORAL at 20:36

## 2018-07-11 RX ADMIN — ALLOPURINOL 100 MG: 100 TABLET ORAL at 08:53

## 2018-07-11 NOTE — PROGRESS NOTES
"Pharmacy Consult  -  Warfarin    Zee Stewart is a 78 year old woman with PMH significant for HTN, chronic atrial fib (on warfarin), and gout.  She presented to [facility] ER with acute heart failure and back pain on 7/9/18 and was admitted for further treatment.    Height - 170 cm (66.93\")  Weight - 93.5 kg (206 lb 3.2 oz)    Consulting Provider: Dr. Loera  Indication: Atrial fibrillation  Goal INR: 2 - 3  Home Regimen:              - 1 mg po daily     Bridge Therapy: No, INR therapeutic (VTE prophylaxis dose discontinued 7/11)     Drug-Drug Interactions with current regimen:              No significant drug-drug interactions noted.     Warfarin Dosing During Admission:     Date  7/9  7/10  7/11               INR  1.95  1.98  2.36               Dose  1 mg this AM at home  1 mg  (1 mg)                  Education Provided: Patient on warfarin prior to admission (since 2001). Warfarin education pamphlet given to patient, education provided verbally and in writing.  Understanding verified with patient teach back.     Discharge Follow up:        Following Provider: PCP (Dr. Rodriguez)        Follow up time range or appointment: TBD, recommend 2-3 days post-discharge    Labs:    Results from last 7 days   Lab Units 07/11/18  0330 07/10/18  0431 07/10/18  0430 07/09/18  1458 07/09/18  0905 07/09/18  0904   INR  2.36* 1.98* --  1.96* 1.95* --    HEMOGLOBIN g/dL 8.8* --  9.3* --  --  9.8*   HEMATOCRIT % 28.4* --  30.9* --  --  32.0*   PLATELETS 10*3/mm3 245 --  230 --  --  243     Results from last 7 days   Lab Units 07/11/18  0330 07/10/18  0430 07/09/18  0905   SODIUM mmol/L 141 144 144   POTASSIUM mmol/L 4.0 4.3 3.3*   CHLORIDE mmol/L 107 113* 108   CO2 mmol/L 25.0 28.0 26.0   BUN mg/dL 36* 23 22   CREATININE mg/dL 1.21 1.13 1.15   CALCIUM mg/dL 7.7* 8.3* 8.3*   BILIRUBIN mg/dL --  0.7 1.1   ALK PHOS U/L --  168* 191*   ALT (SGPT) U/L --  8 9   AST (SGOT) U/L --  18 19   GLUCOSE mg/dL 108* 139* 159*     Current dietary intake: " Regular, cardiac diet ordered. No PO intake documented 7/10.    Assessment/Plan:     Patient's INR is therapeutic at 2.36 today (up from 1.98 yesterday).  Continue home dose of warfarin 1 mg today. Consider dose decrease tomorrow if INR continues to increase.  Daily PT/INR ordered.  Monitor signs/symptoms of bleeding, dietary intake, and drug-drug interactions. Make dose adjustments as necessary.  Pharmacy will continue to follow.    Elma Evans, PharmD  7/11/2018  10:04 AM

## 2018-07-11 NOTE — PLAN OF CARE
Problem: Patient Care Overview  Goal: Plan of Care Review  Outcome: Ongoing (interventions implemented as appropriate)   07/11/18 9975   Plan of Care Review   Progress improving   OTHER   Outcome Summary More endurance with ambulation today. SATs stable and not wheezing noted. L leg remains weak at quads and hip flexors

## 2018-07-11 NOTE — THERAPY TREATMENT NOTE
Acute Care - Physical Therapy Treatment Note  [facility]     Patient Name: Zee Stewart  : 1940  MRN: [id number]  Today's Date: 2018  Onset of Illness/Injury or Date of Surgery: 18  Date of Referral to PT: 18  Referring Physician: Dr Loera    Admit Date: 2018    Visit Dx:    ICD-10-CM ICD-9-CM   1. Acute congestive heart failure, unspecified congestive heart failure type (CMS/HCC) I50.9 428.0   2. Hypoxia R09.02 799.02   3. Myositis of lower extremity, unspecified laterality, unspecified myositis type M60.869 729.1   4. Abnormal blood level of uric acid E79.0 790.6   5. Positive D dimer R79.89 790.92   6. Chronic anticoagulation Z79.01 V58.61   7. Chronic atrial fibrillation (CMS/HCC) I48.2 427.31   8. DDD (degenerative disc disease), lumbar M51.36 722.52   9. Deterioration in ability to walk R26.2 781.2   10. Hypokalemia E87.6 276.8   11. Impaired mobility and ADLs Z74.09 799.89   12. Impaired functional mobility, balance, gait, and endurance Z74.09 V49.89     Patient Active Problem List   Diagnosis   • Chronic atrial fibrillation (CMS/HCC)   • Hypokalemia   • Gout   • Hypertension   • Elevated CK   • Acute diastolic congestive heart failure (CMS/HCC)   • Anemia   • Leukocytosis   • Warfarin anticoagulation   • Hyperglycemia   • Pulmonary hypertension   • Moderate to severe tricuspid regurgitation   • Back pain due to recent fall       Therapy Treatment          Rehabilitation Treatment Summary     Row Name 18 1320             Treatment Time/Intention    Discipline physical therapist  -SC      Document Type therapy note (daily note)  -SC      Subjective Information no complaints  -SC      Mode of Treatment physical therapy  -SC      Patient/Family Observations daughter  -SC      Care Plan Review risks/benefits reviewed  -SC      Therapy Frequency (PT Clinical Impression) daily  -SC      Patient Effort good  -SC      Existing Precautions/Restrictions fall  -SC      Recorded by [SC]  Hilda SHEIKH Elysia, PT 07/11/18 1540      Row Name 07/11/18 1320             Vital Signs    Pre SpO2 (%) 96  -SC      O2 Delivery Pre Treatment room air  -SC      Intra SpO2 (%) 95  -SC      O2 Delivery Intra Treatment room air  -SC      Post SpO2 (%) 96  -SC      O2 Delivery Post Treatment room air  -SC      Recorded by [SC] Hilda Stevensp, PT 07/11/18 1540      Row Name 07/11/18 1320             Cognitive Assessment/Intervention- PT/OT    Orientation Status (Cognition) oriented x 4  -SC      Follows Commands (Cognition) WNL  -SC      Recorded by [SC] Hilda SHEIKH Elysia, PT 07/11/18 1540      Row Name 07/11/18 1320             Safety Issues, Functional Mobility    Impairments Affecting Function (Mobility) balance;strength  -SC      Recorded by [SC] Hilda Stevensp, PT 07/11/18 1540      Row Name 07/11/18 1320             Bed Mobility Assessment/Treatment    Comment (Bed Mobility) uic  -SC      Recorded by [SC] Hilda Stevensp, PT 07/11/18 1540      Row Name 07/11/18 1320             Transfer Assessment/Treatment    Transfer Assessment/Treatment sit-stand transfer;stand-sit transfer  -SC      Comment (Transfers) demonstrated good technique  -SC      Recorded by [SC] Hilda Stevensp, PT 07/11/18 1540      Row Name 07/11/18 1320             Sit-Stand Transfer    Sit-Stand [place] (Transfers) conditional independence  -SC      Assistive Device (Sit-Stand Transfers) walker, front-wheeled  -SC      Recorded by [SC] Hilda Stevensp, PT 07/11/18 1540      Row Name 07/11/18 1320             Stand-Sit Transfer    Stand-Sit [place] (Transfers) conditional independence  -SC      Assistive Device (Stand-Sit Transfers) walker, front-wheeled  -SC      Recorded by [SC] Hilda SHEIKH Elysia, PT 07/11/18 1540      Row Name 07/11/18 1320             Gait/Stairs Assessment/Training    68739 - Gait Training Minutes  18  -SC      Gait/Stairs Assessment/Training gait/ambulation independence  -SC      [place]eman Level (Gait) contact  guard;verbal cues  -SC      Assistive Device (Gait) walker, front-wheeled  -SC      Distance in Feet (Gait) 250  -SC      Pattern (Gait) swing-through  -SC      Deviations/Abnormal Patterns (Gait) sajan decreased  -SC      Comment (Gait/Stairs) cues for staying close to walker. No buckling noted, no wheezing noted.  -SC      Recorded by [SC] Hilda Stevensp, PT 07/11/18 1540      Row Name 07/11/18 1320             Motor Skills Assessment/Interventions    Additional Documentation Therapeutic Exercise (Group);Therapeutic Exercise Interventions (Group)  -SC      Recorded by [SC] Hilda Stevensp, PT 07/11/18 1540      Row Name 07/11/18 1320             Therapeutic Exercise    95543 - PT Therapeutic Exercise Minutes 6  -SC      Recorded by [SC] Hilda Stevensp, PT 07/11/18 1540      Row Name 07/11/18 1320             Therapeutic Exercise    Lower Extremity (Therapeutic Exercise) LAQ (long arc quad), bilateral;marching while seated  -SC      Exercise Type (Therapeutic Exercise) AROM (active range of motion)  -SC      Position (Therapeutic Exercise) seated  -SC      Sets/Reps (Therapeutic Exercise) 10  -SC      Recorded by [SC] Hilda Stevensp, PT 07/11/18 1540      Row Name 07/11/18 1320             Balance    Balance dynamic standing balance  -SC      Recorded by [SC] Hilda Stevensp, PT 07/11/18 1540      Row Name 07/11/18 1320             Dynamic Standing Balance    Level of [place], Reaches Outside Midline (Standing, Dynamic Balance) contact guard assist   walker  -SC      Recorded by [SC] Hilda Stevensp, PT 07/11/18 1540      Row Name 07/11/18 1320             Positioning and Restraints    Pre-Treatment Position sitting in chair/recliner  -SC      Post Treatment Position chair  -SC      In Chair sitting;call light within reach;encouraged to call for assist;exit alarm on  -SC      Recorded by [SC] Hilda Stevensp, PT 07/11/18 1540        User Key  (r) = Recorded By, (t) = Taken By, (c) = Cosigned By    Initials Name  Effective Dates Discipline    SC Oswaldhai SHEIKH Elysia, PT 06/19/15 -  PT                     Physical Therapy Education     Title: PT OT SLP Therapies (Active)     Topic: Physical Therapy (Done)     Point: Mobility training (Done)    Learning Progress Summary     Learner Status Readiness Method Response Comment Documented by    Patient Done EVA Ingram NR reviewed safety with mobility SC 07/11/18 1540     Active Allegra WIGGINS reviewed safety with mobiltiy SC 07/10/18 1708          Point: Home exercise program (Done)    Learning Progress Summary     Learner Status Readiness Method Response Comment Documented by    Patient Done EVA Ingram NR reviewed safety with mobility SC 07/11/18 1540     Active Allegra WIGGINS reviewed safety with mobiltiy SC 07/10/18 1708          Point: Body mechanics (Done)    Learning Progress Summary     Learner Status Readiness Method Response Comment Documented by    Patient Done EVA Ingram NR reviewed safety with mobility SC 07/11/18 1540     Active Allegra WIGGINS reviewed safety with mobiltiy SC 07/10/18 1708          Point: Precautions (Done)    Learning Progress Summary     Learner Status Readiness Method Response Comment Documented by    Patient Done EVA Ingram NR reviewed safety with mobility SC 07/11/18 1540     Active Allegra WIGGINS reviewed safety with mobiltiy SC 07/10/18 1708                      User Key     Initials Effective Dates Name Provider Type Discipline    SC 06/19/15 -  Hilda AGUILAR Elysia, PT Physical Therapist PT                    PT Recommendation and Plan  Anticipated Discharge Disposition (PT): home with home health  Planned Therapy Interventions (PT Eval): bed mobility training, gait training, home exercise program, patient/family education, postural re-education, strengthening, transfer training  Therapy Frequency (PT Clinical Impression): daily  Outcome Summary/Treatment Plan (PT)  Anticipated Discharge Disposition (PT): home with home health  Plan of Care Reviewed With:  patient  Progress: improving  Outcome Summary: More endurance ih ambulation today. Sats stable and not wheezing noted. L leg remains weak  at quads and hip flexors          Outcome Measures     Row Name 07/11/18 1320 07/10/18 1515 07/10/18 0745       How much help from another person do you currently need...    Turning from your back to your side while in flat bed without using bedrails? 4  -SC 4  -SC  --    Moving from lying on back to sitting on the side of a flat bed without bedrails? 4  -SC 3  -SC  --    Moving to and from a bed to a chair (including a wheelchair)? 3  -SC 3  -SC  --    Standing up from a chair using your arms (e.g., wheelchair, bedside chair)? 3  -SC 3  -SC  --    Climbing 3-5 steps with a railing? 3  -SC 3  -SC  --    To walk in hospital room? 3  -SC 3  -SC  --    AM-PAC 6 Clicks Score 20  -SC 19  -SC  --       How much help from another is currently needed...    Putting on and taking off regular lower body clothing?  --  -- 2  -AC    Bathing (including washing, rinsing, and drying)  --  -- 2  -AC    Toileting (which includes using toilet bed pan or urinal)  --  -- 3  -AC    Putting on and taking off regular upper body clothing  --  -- 3  -AC    Taking care of personal grooming (such as brushing teeth)  --  -- 4  -AC    Eating meals  --  -- 4  -AC    Score  --  -- 18  -AC       Functional Assessment    Outcome Measure Options AM-PAC 6 Clicks Basic Mobility (PT)  -SC AM-PAC 6 Clicks Basic Mobility (PT)  -SC AM-PAC 6 Clicks Daily Activity (OT)  -AC      User Key  (r) = Recorded By, (t) = Taken By, (c) = Cosigned By    Initials Name Provider Type    SC Hilda Naidu, PT Physical Therapist    PHILOMENA Smith, OT Occupational Therapist           Time Calculation:         PT Charges     Row Name 07/11/18 1543 07/11/18 1320          Time Calculation    Start Time 1320  -SC  --     PT Received On 07/11/18  -SC  --     PT Goal Re-Cert Due Date 07/20/18  -SC  --        Time Calculation- PT    Total  Timed Code Minutes- PT 23 minute(s)  -SC  --        Timed Charges    09396 - PT Therapeutic Exercise Minutes  -- 6  -SC     61620 - Gait Training Minutes   -- 18  -SC       User Key  (r) = Recorded By, (t) = Taken By, (c) = Cosigned By    Initials Name Provider Type    SC Hilda Naidu, PT Physical Therapist        Therapy Suggested Charges     Code   Minutes Charges    22326 (CPT®) Hc Pt Neuromusc Re Education Ea 15 Min      11166 (CPT®) Hc Pt Ther Proc Ea 15 Min 6 1    53052 (CPT®) Hc Gait Training Ea 15 Min 18 1    46445 (CPT®) Hc Pt Therapeutic Act Ea 15 Min      44575 (CPT®) Hc Pt Manual Therapy Ea 15 Min      95573 (CPT®) Hc Pt Iontophoresis Ea 15 Min      37850 (CPT®) Hc Pt Elec Stim Ea-Per 15 Min      08827 (CPT®) Hc Pt Ultrasound Ea 15 Min      45702 (CPT®) Hc Pt Self Care/Mgmt/Train Ea 15 Min      Total  24 2        Therapy Charges for Today     Code Description Service Date Service Provider Modifiers Qty    38811738365 HC PT MOBILITY CURRENT 7/10/2018 Shearon A Elysia, PT GP, CJ 1    52663070645 HC PT MOBILITY PROJECTED 7/10/2018 Shearon A Elysia, PT GP, CI 1    94047250990 HC PT EVAL LOW COMPLEXITY 4 7/10/2018 Shearon A Elysia, PT GP 1    77519043596 HC PT THER SUPP EA 15 MIN 7/10/2018 Shearon A Elysia, PT GP 2    22211510187 HC PT THER PROC EA 15 MIN 7/11/2018 Shearon A Elysia, PT GP 1    71657421678 HC GAIT TRAINING EA 15 MIN 7/11/2018 Shearon A Elysia, PT GP 1          PT G-Codes  Outcome Measure Options: AM-PAC 6 Clicks Basic Mobility (PT)  Score: 19  Functional Limitation: Mobility: Walking and moving around  Mobility: Walking and Moving Around Current Status (): At least 20 percent but less than 40 percent impaired, limited or restricted  Mobility: Walking and Moving Around Goal Status (): At least 1 percent but less than 20 percent impaired, limited or restricted    Hilda Naidu, PT  7/11/2018

## 2018-07-11 NOTE — PROGRESS NOTES
[facility]  PROGRESS NOTE    Patient Name: Zee Stewart  : 1940  MRN: [id number]    Date of Admission: 2018  Length of Stay: 1  Primary Care Physician: Raymond Becker MD    Subjective   Subjective     CC:  F/U SOA, back pain    HPI:  Patient seen this morning. Off oxygen, maintaining O2 sats in upper 90s. Her breathing is much better, not as short of breath when ambulating to bathroom and back. She noticed she gained 2 lbs since yesterday however, is concerned. Ambulating well with walker and PT.     Review of Systems  Gen-no fevers, no chills  CV-no chest pain, no palpitations  Resp-no cough, improved dyspnea  GI-no N/V/D, no abd pain    Otherwise ROS is negative except as mentioned in the HPI.    Objective   Objective     Vital Signs:   Temp:  [97.5 °F (36.4 °C)-97.8 °F (36.6 °C)] 97.8 °F (36.6 °C)  Heart Rate:  [74-95] 74  Resp:  [16-18] 16  BP: (134-138)/(65-82) 138/65        Physical Exam:  Gen-no acute distress  CV-irregular, S1 S2 normal, no m/r/g  Resp-decreased at the bases bilaterally, normal effort  Abd-soft, NT, ND, +BS  Ext-1+ BLE edema, improved  Neuro-A&Ox3, no focal deficits  Psych-appropriate mood      Results Reviewed:  I have personally reviewed current lab, radiology, and data and agree.      Results from last 7 days  Lab Units 18  0330 07/10/18  0431 07/10/18  0430 18  1458  18  0904   WBC 10*3/mm3 11.89*  --  8.10  --   --  11.64*   HEMOGLOBIN g/dL 8.8*  --  9.3*  --   --  9.8*   HEMATOCRIT % 28.4*  --  30.9*  --   --  32.0*   PLATELETS 10*3/mm3 245  --  230  --   --  243   INR  2.36* 1.98*  --  1.96*  < >  --    < > = values in this interval not displayed.    Results from last 7 days  Lab Units 18  0330 07/10/18  0430 18  0905   SODIUM mmol/L 141 144 144   POTASSIUM mmol/L 4.0 4.3 3.3*   CHLORIDE mmol/L 107 113* 108   CO2 mmol/L 25.0 28.0 26.0   BUN mg/dL 36* 23 22   CREATININE mg/dL 1.21 1.13 1.15    GLUCOSE mg/dL 108* 139* 159*   CALCIUM mg/dL 7.7* 8.3* 8.3*   ALT (SGPT) U/L  --  8 9   AST (SGOT) U/L  --  18 19     Estimated Creatinine Clearance: 45.6 mL/min (by C-G formula based on SCr of 1.21 mg/dL).  BNP   Date Value Ref Range Status   07/11/2018 256.0 (H) 0.0 - 100.0 pg/mL Final     Comment:     Results may be falsely decreased if patient taking Biotin.       Microbiology Results Abnormal     None          Imaging Results (last 24 hours)     ** No results found for the last 24 hours. **        Results for orders placed during the hospital encounter of 07/09/18   Adult Transthoracic Echo Complete W/ Cont if Necessary Per Protocol    Narrative · Moderate pulmonic valve regurgitation is present.  · Left atrial cavity size is mild-to-moderately dilated.  · Mild mitral valve regurgitation is present.  · Moderate to severe tricuspid valve regurgitation is present.  · Calculated right ventricular systolic pressure from tricuspid   regurgitation is 66 mmHg.  · Right ventricular cavity is mild-to-moderately dilated.  · Moderate aortic valve regurgitation is present.  · Left ventricular systolic function is normal. Estimated EF = 70%.  · There is no evidence of pericardial effusion.  · Moderate to severe pulmonary hypertension is present.  · Mild MAC is present.  · The aortic valve exhibits sclerosis.          I have reviewed the medications.    Assessment/Plan   Assessment / Plan     Hospital Problem List     * (Principal)Acute diastolic congestive heart failure (CMS/HCC)    Chronic atrial fibrillation (CMS/HCC)    Hypokalemia    Gout (Chronic)    Hypertension (Chronic)    Elevated CK    Anemia    Leukocytosis    Warfarin anticoagulation (Chronic)    Hyperglycemia    Pulmonary hypertension    Moderate to severe tricuspid regurgitation    Back pain due to recent fall             Brief Hospital Course to date:  Zee Stewart is a 78 y.o. female with hx of HTN, chronic Afib on Coumadin (followed by Dr. Rodriguez), and  gout presents due to increased SOA and BLE edema. She also had a recent fall and has had back pain and difficulty walking. In the ER, labs showed  and CTA chest showed cardiac enlargement and bilateral pleural effusions, no PE. Admitted to hospitalists for heart failure management.       Assessment & Plan:  --Continue diuresis with 20 mg IV Lasix BID. Strict I&O and daily weights. Can probably switch to oral Lasix tomorrow.   --Echo reviewed. Shows normal EF but moderate to severe TR and pulmonary HTN. I wonder if she has untreated KATIA (nursing staff has noted nocturnal apnea and desaturation). Recommend outpatient sleep study after discharge as well as close follow up with Dr. Rodriguez.   --MRI L spine shows some narrowing but no severe stenosis or acute fractures. Continue PT/OT and supportive care. Pain improved, did not want Lidoderm patch this morning.   --Plan home tomorrow with HHPT if continues to improve.     DVT Prophylaxis:  Coumadin    CODE STATUS:   Code Status and Medical Interventions:   Ordered at: 07/09/18 1443     Level Of Support Discussed With:    Patient     Code Status:    CPR     Medical Interventions (Level of Support Prior to Arrest):    Full       Disposition: I expect the patient to be discharged home in ~1 day    Electronically signed by Sejal Salazar MD, 07/11/18, 9:25 AM.

## 2018-07-12 VITALS
BODY MASS INDEX: 32.49 KG/M2 | SYSTOLIC BLOOD PRESSURE: 128 MMHG | WEIGHT: 207 LBS | OXYGEN SATURATION: 95 % | DIASTOLIC BLOOD PRESSURE: 62 MMHG | TEMPERATURE: 97.6 F | HEIGHT: 67 IN | RESPIRATION RATE: 20 BRPM | HEART RATE: 76 BPM

## 2018-07-12 LAB
ANION GAP SERPL CALCULATED.3IONS-SCNC: 7 MMOL/L (ref 3–11)
BUN BLD-MCNC: 37 MG/DL (ref 9–23)
BUN/CREAT SERPL: 31.1 (ref 7–25)
CALCIUM SPEC-SCNC: 7.4 MG/DL (ref 8.7–10.4)
CHLORIDE SERPL-SCNC: 107 MMOL/L (ref 99–109)
CO2 SERPL-SCNC: 27 MMOL/L (ref 20–31)
CREAT BLD-MCNC: 1.19 MG/DL (ref 0.6–1.3)
DEPRECATED RDW RBC AUTO: 57.5 FL (ref 37–54)
ERYTHROCYTE [DISTWIDTH] IN BLOOD BY AUTOMATED COUNT: 18.6 % (ref 11.3–14.5)
GFR SERPL CREATININE-BSD FRML MDRD: 44 ML/MIN/1.73
GLUCOSE BLD-MCNC: 81 MG/DL (ref 70–100)
HCT VFR BLD AUTO: 29.5 % (ref 34.5–44)
HGB BLD-MCNC: 9.1 G/DL (ref 11.5–15.5)
INR PPP: 2.73 (ref 0.91–1.09)
MCH RBC QN AUTO: 26.4 PG (ref 27–31)
MCHC RBC AUTO-ENTMCNC: 30.8 G/DL (ref 32–36)
MCV RBC AUTO: 85.5 FL (ref 80–99)
PLATELET # BLD AUTO: 251 10*3/MM3 (ref 150–450)
PMV BLD AUTO: 11.4 FL (ref 6–12)
POTASSIUM BLD-SCNC: 3.9 MMOL/L (ref 3.5–5.5)
PROTHROMBIN TIME: 28.7 SECONDS (ref 9.6–11.5)
RBC # BLD AUTO: 3.45 10*6/MM3 (ref 3.89–5.14)
SODIUM BLD-SCNC: 141 MMOL/L (ref 132–146)
WBC NRBC COR # BLD: 9.56 10*3/MM3 (ref 3.5–10.8)

## 2018-07-12 PROCEDURE — 99217 PR OBSERVATION CARE DISCHARGE MANAGEMENT: CPT | Performed by: PHYSICIAN ASSISTANT

## 2018-07-12 PROCEDURE — G0378 HOSPITAL OBSERVATION PER HR: HCPCS

## 2018-07-12 PROCEDURE — 85610 PROTHROMBIN TIME: CPT | Performed by: FAMILY MEDICINE

## 2018-07-12 PROCEDURE — 97116 GAIT TRAINING THERAPY: CPT

## 2018-07-12 PROCEDURE — 80048 BASIC METABOLIC PNL TOTAL CA: CPT | Performed by: HOSPITALIST

## 2018-07-12 PROCEDURE — 97110 THERAPEUTIC EXERCISES: CPT

## 2018-07-12 PROCEDURE — 85027 COMPLETE CBC AUTOMATED: CPT | Performed by: HOSPITALIST

## 2018-07-12 RX ORDER — WARFARIN SODIUM 1 MG/1
0.5 TABLET ORAL
Status: DISCONTINUED | OUTPATIENT
Start: 2018-07-12 | End: 2018-07-12 | Stop reason: HOSPADM

## 2018-07-12 RX ORDER — LISINOPRIL 5 MG/1
5 TABLET ORAL DAILY
Qty: 30 TABLET | Refills: 1 | Status: SHIPPED | OUTPATIENT
Start: 2018-07-13

## 2018-07-12 RX ORDER — FUROSEMIDE 20 MG/1
20 TABLET ORAL DAILY
Status: DISCONTINUED | OUTPATIENT
Start: 2018-07-12 | End: 2018-07-12

## 2018-07-12 RX ORDER — WARFARIN SODIUM 1 MG/1
1 TABLET ORAL
Status: DISCONTINUED | OUTPATIENT
Start: 2018-07-13 | End: 2018-07-12 | Stop reason: HOSPADM

## 2018-07-12 RX ORDER — FUROSEMIDE 40 MG/1
40 TABLET ORAL DAILY
Status: DISCONTINUED | OUTPATIENT
Start: 2018-07-12 | End: 2018-07-12 | Stop reason: HOSPADM

## 2018-07-12 RX ADMIN — FUROSEMIDE 40 MG: 40 TABLET ORAL at 09:15

## 2018-07-12 RX ADMIN — ALLOPURINOL 100 MG: 100 TABLET ORAL at 09:15

## 2018-07-12 RX ADMIN — LISINOPRIL 5 MG: 5 TABLET ORAL at 09:15

## 2018-07-12 RX ADMIN — METOPROLOL SUCCINATE 200 MG: 50 TABLET, EXTENDED RELEASE ORAL at 09:14

## 2018-07-12 NOTE — PROGRESS NOTES
"Pharmacy Consult  -  Warfarin    Zee Stewart is a 78 year old woman with PMH significant for HTN, chronic atrial fib (on warfarin), and gout.  She presented to [facility] ER with acute heart failure and back pain on 7/9/18 and was admitted for further treatment.    Height - 170 cm (66.93\")  Weight - 93.9 kg (207 lb)    Consulting Provider: Dr. Loera  Indication: Atrial fibrillation  Goal INR: 2 - 3  Home Regimen:              - 1 mg po daily     Bridge Therapy: No, INR therapeutic (VTE prophylaxis lovenox dose discontinued 7/11)     Drug-Drug Interactions with current regimen:              No significant drug-drug interactions noted.     Warfarin Dosing During Admission:     Date  7/9  7/10  7/11  7/12             INR  1.95  1.98  2.36  2.73             Dose  1 mg this AM at home  1 mg  1 mg  (0.5mg)                Education Provided: Patient on warfarin prior to admission (since 2001). Warfarin education pamphlet given to patient, education provided verbally and in writing.  Understanding verified with patient teach back.     Discharge Follow up:        Following Provider: PCP (Dr. Rodriguez)        Follow up time range or appointment: TBD, recommend 2-3 days post-discharge    Labs:    Results from last 7 days   Lab Units 07/12/18  0332 07/11/18  0330 07/10/18  0431 07/10/18  0430 07/09/18  1458 07/09/18  0905 07/09/18  0904   INR  2.73* 2.36* 1.98* --  1.96* 1.95* --    HEMOGLOBIN g/dL 9.1* 8.8* --  9.3* --  --  9.8*   HEMATOCRIT % 29.5* 28.4* --  30.9* --  --  32.0*   PLATELETS 10*3/mm3 251 245 --  230 --  --  243     Results from last 7 days   Lab Units 07/12/18  0332 07/11/18  0330 07/10/18  0430 07/09/18  0905   SODIUM mmol/L 141 141 144 144   POTASSIUM mmol/L 3.9 4.0 4.3 3.3*   CHLORIDE mmol/L 107 107 113* 108   CO2 mmol/L 27.0 25.0 28.0 26.0   BUN mg/dL 37* 36* 23 22   CREATININE mg/dL 1.19 1.21 1.13 1.15   CALCIUM mg/dL 7.4* 7.7* 8.3* 8.3*   BILIRUBIN mg/dL --  --  0.7 1.1   ALK PHOS U/L --  --  168* 191*   ALT (SGPT) " U/L --  --  8 9   AST (SGOT) U/L --  --  18 19   GLUCOSE mg/dL 81 108* 139* 159*     Current dietary intake: Regular, cardiac diet ordered. No PO intake documented 7/11.    Assessment/Plan:     Patient's INR is therapeutic at 2.73 today (up from 2.36 yesterday).  Due to steady increase in INR, will give a lower dose of warfarin 0.5 mg tonight.   Can likely resume home dose of warfarin 1 mg daily tomorrow. Recommend follow-up INR 2-3 days after discharge.  Daily PT/INR ordered.  Monitor signs/symptoms of bleeding, dietary intake, and drug-drug interactions. Make dose adjustments as necessary.  Pharmacy will continue to follow.    Elma Evans, PharmD  7/12/2018  9:22 AM

## 2018-07-12 NOTE — PLAN OF CARE
Problem: Patient Care Overview  Goal: Plan of Care Review   07/12/18 0315   Coping/Psychosocial   Plan of Care Reviewed With patient   Plan of Care Review   Progress improving   OTHER   Outcome Summary VSS, AF with controlled rate, No c/o SOA even w amb to BR. No crackles heard. Diruesing from Lasix . O2 sats dropped to 86-88 numerous times while asleep, lowest seen was 83%. O2 [id number] at 2L NC.       Problem: Fall Risk (Adult)  Goal: Absence of Fall   07/12/18 0315   Fall Risk (Adult)   Absence of Fall achieves outcome

## 2018-07-12 NOTE — CONSULTS
[facility]  Patient Name:  Zee Stewart  :  1940  DOS:  18    Hospital Problem List     * (Principal)Acute diastolic congestive heart failure (CMS/HCC)    Chronic atrial fibrillation (CMS/HCC)    Hypokalemia    Gout (Chronic)    Hypertension (Chronic)    Elevated CK    Anemia    Leukocytosis    Warfarin anticoagulation (Chronic)    Hyperglycemia    Pulmonary hypertension    Moderate to severe tricuspid regurgitation    Back pain due to recent fall          Consult has been received.  Pt with hx of DHF, Afib (chronic), moderate to severe TR, PHTN.  Medical records have been reviewed.  Patient is a good candidate for the [facility].  Education provided.  Education time 20 min.  Pt is scheduled f/u with cardiologist, Dr. Rodriguez in 2 weeks.  F/u scheduled for PCP 1 weeks.  Discussed role of [facility] and when to call as a resource.  Pt is being d/c'd with Home health.  Pt reports that her son has scheduled a sleep study evaluation as well.      Echo Results: 18  · Moderate pulmonic valve regurgitation is present.  · Left atrial cavity size is mild-to-moderately dilated.  · Mild mitral valve regurgitation is present.  · Moderate to severe tricuspid valve regurgitation is present.  · Calculated right ventricular systolic pressure from tricuspid regurgitation is 66 mmHg.  · Right ventricular cavity is mild-to-moderately dilated.  · Moderate aortic valve regurgitation is present.  · Left ventricular systolic function is normal. Estimated EF = 70%.  · There is no evidence of pericardial effusion.  · Moderate to severe pulmonary hypertension is present.  · Mild MAC is present.  · The aortic valve exhibits sclerosis.    NYHA Class: III    Heart Failure Quality Measures    Pt is being d/c'd on Lasix.    Pt on Lisinopril and metoprolol    Heart Failure Education    Risk factors, Medications management and adherance, Low Na diet, Signs / symptoms, Daily weight monitoring,  Exercise / activity / Cardiac Rehab, Weight management, Importance of keeping follow up office visits, EF teaching and Other:  Discussed risk factors for KATIA, importance of DX and managment in setting VHD, DHF, Afib, ect.       Atrial fibrillation / Atrial flutter:  Quality Measure    CHADS-VASc Score:  CHADS-VASc Risk Assessment            5       Total Score        1 CHF    1 Hypertension    2 Age >/= 75    1 Sex: Female        Criteria that do not apply:    DM    PRIOR STROKE/TIA/THROMBO    Vascular Disease    Age 65-74            Anticoagulation for CHADS-VASc >/=2    Yes  Coumadin, followed by Dr. Rodriguez.  F/u scheduled 2 weeks.  Pt is seeing PCP 1 week.     Statin Therapy (for patients with a history of CAD, CVA/TIA, PVA, DM)  No hx of known CAD, CVA/TIA, PVA, or DM.  No known hyperlipidemia.  Discussed indications in setting of AFib.  Encouraged pt to discuss with Dr. Rodriguez to see if appriopriate.     Atrial fibrillation / Atrial flutter education:   Risk factors, Signs / symptoms, Medications management and adherance, Stroke prevention, Bleeding risks and Role of [facility] and when to call

## 2018-07-12 NOTE — DISCHARGE SUMMARY
"         [facility] Medicine Services  DISCHARGE SUMMARY    Patient Name: Zee Stewart  : 1940  MRN: [id number]    Date of Admission: 2018  Date of Discharge:  2018  Primary Care Physician: Raymond Becker MD    Hospital Course     Presenting Problem:   Acute congestive heart failure, unspecified congestive heart failure type (CMS/HCC) [I50.9]  Acute congestive heart failure, unspecified congestive heart failure type (CMS/HCC) [I50.9]    Active Hospital Problems    Diagnosis Date Noted   • **Acute diastolic congestive heart failure (CMS/HCC) [I50.9] 2018   • Pulmonary hypertension [I27.20] 07/10/2018   • Moderate to severe tricuspid regurgitation [I07.1] 07/10/2018   • Back pain due to recent fall [M54.9] 07/10/2018   • Chronic atrial fibrillation (CMS/HCC) [I48.2] 2018   • Hypokalemia [E87.6] 2018   • Gout [M10.9] 2018   • Hypertension [I10] 2018   • Elevated CK [R74.8] 2018   • Anemia [D64.9] 2018   • Leukocytosis [D72.829] 2018   • Warfarin anticoagulation [Z79.01] 2018   • Hyperglycemia [R73.9] 2018      Resolved Hospital Problems    Diagnosis Date Noted Date Resolved   No resolved problems to display.      Hospital Course:  Ms. Zee Stewart is a 79yo female with PMH significant for HTN, chronic atrial fibrillation (on Warfarin, followed by Dr. Rodriguez) and gout. She presented to [facility] ED on 2018 with complaints of dyspnea, worsening BLE edema, abdominal \"tightness\" and orthopnea. She noted dyspnea with minimal exertion. No known history of CHF. Additionally, she noted left-sided low back pain with radiation into her hip and left leg. She notes that she fell four days prior to presentation and had \"thrown her back out.\" She had been wheeling around the house in a wheelchair due to pain with ambulation. No saddle anesthesia, no urinary or fecal incontinence. Tramadol has helped her pain. "     ED evaluation revealed , WBCs 11.6, H/H 9.8/32.0, K+ 3.3, ESR 39 and . CTA negative for PE but did show some cardiac enlargement with small bilateral pleural effusions. She was given Lasix IV and admitted to the hospital medicine service. Echocardiogram normal LV systolic function with EF 70%, mild MVR, severe TVR with RVSP 66mmHg.    MRI of the lumbar spine showed bilateral neural foraminal narrowing bilaterally at L3/L4 and L4/L5 levels and on the left at L2/L3. No definite nerve root compromise noted. Pain was treated conservatively.     Ms. Stewart is improved and she will discharge home in stable condition on 7/12/18.   She will discharge with nocturnal O2 for hypoxia - outpatient sleep study arranged for 7/26/18.   Follow up with primary cardiologist (Dr. Rodriguez) in 2-3 weeks  Patient seen by heart failure navigator prior to discharge.    Discharge Follow Up Recommendations for labs/diagnostics:  Follow up with Dr. Rodriguez in 2-3 weeks   Outpatient sleep study on 7/26/18    Day of Discharge     HPI:   Improving. Some exertional dyspnea this morning. Back pain much better and she is ambulating with a walker. Wants to go home. Denies chest pain, N/V/D or constipation. No fevers or chills.     Review of Systems  Gen- No fevers, chills  CV- No chest pain, palpitations  Resp- No cough, dyspnea  GI- No N/V/D, abd pain    Otherwise ROS is negative except as mentioned in the HPI.    Vital Signs:   Temp:  [97.6 °F (36.4 °C)-98.4 °F (36.9 °C)] 97.6 °F (36.4 °C)  Heart Rate:  [56-84] 76  Resp:  [18-20] 20  BP: (125-131)/(62-81) 128/62     Physical Exam:  Constitutional: No acute distress, awake, alert  HENT: NCAT, mucous membranes moist  Respiratory: Bases decreased bilaterally without wheezes, rales or rhonchi. Normal respiratory effort. On room air. Cardiovascular:  Irregularly irregular without m/r/g, palpable pedal pulses bilaterally  Gastrointestinal: Positive bowel sounds, soft, nontender,  nondistended  Musculoskeletal: No bilateral ankle edema  Psychiatric: Appropriate affect, cooperative  Neurologic: Oriented x 3, strength symmetric in all extremities, Cranial Nerves grossly intact to confrontation, speech clear  Skin: No rashes    Pertinent  and/or Most Recent Results       Results from last 7 days  Lab Units 07/12/18  0332 07/11/18  0330 07/10/18  0430 07/09/18  0905 07/09/18  0904   WBC 10*3/mm3 9.56 11.89* 8.10  --  11.64*   HEMOGLOBIN g/dL 9.1* 8.8* 9.3*  --  9.8*   HEMATOCRIT % 29.5* 28.4* 30.9*  --  32.0*   PLATELETS 10*3/mm3 251 245 230  --  243   SODIUM mmol/L 141 141 144 144  --    POTASSIUM mmol/L 3.9 4.0 4.3 3.3*  --    CHLORIDE mmol/L 107 107 113* 108  --    CO2 mmol/L 27.0 25.0 28.0 26.0  --    BUN mg/dL 37* 36* 23 22  --    CREATININE mg/dL 1.19 1.21 1.13 1.15  --    GLUCOSE mg/dL 81 108* 139* 159*  --    CALCIUM mg/dL 7.4* 7.7* 8.3* 8.3*  --        Results from last 7 days  Lab Units 07/12/18  0332 07/11/18  0330 07/10/18  0431 07/10/18  0430 07/09/18  1458 07/09/18  0905   BILIRUBIN mg/dL  --   --   --  0.7  --  1.1   ALK PHOS U/L  --   --   --  168*  --  191*   ALT (SGPT) U/L  --   --   --  8  --  9   AST (SGOT) U/L  --   --   --  18  --  19   PROTIME Seconds 28.7* 24.8* 20.8*  --  20.6* 20.5*   INR  2.73* 2.36* 1.98*  --  1.96* 1.95*       Results from last 7 days  Lab Units 07/11/18  0330 07/10/18  0430 07/09/18  0905 07/09/18  0904   TSH mIU/mL  --  0.822 2.010  --    HEMOGLOBIN A1C %  --  5.90*  --   --    BNP pg/mL 256.0*  --   --  420.0*     Brief Urine Lab Results  (Last result in the past 365 days)      Color   Clarity   Blood   Leuk Est   Nitrite   Protein   CREAT   Urine HCG        07/09/18 1854 Yellow Clear Small (1+)(A) Trace(A) Negative 30 mg/dL (1+)(A)             Microbiology Results Abnormal     None        Imaging Results (all)     Procedure Component Value Units Date/Time    CT Angiogram Chest With Contrast [[id number]] Collected:  07/09/18 1412     Updated:   07/09/18 1452    Narrative:       EXAMINATION: CT ANGIOGRAM CHEST W CONTRAST- 07/09/2018     INDICATION: I50.9-Heart failure, unspecified; R09.02-Hypoxemia;  M60.869-Other myositis, unspecified lower leg; E79.0-Hyperuricemia  without signs of inflammatory arthritis and tophaceous disease;  R79.89-Other specified abnormal findings of blood chemistry; Z79.01-Long  term (current) use of anticoagulants; I48.2-Chronic atrial fibrillation;  M51.36-Other intervertebral disc degeneration, lumbar; shortness of  breath, leg swelling     TECHNIQUE: Multiple axial CT imaging was obtained of the chest following  the administration of intravenous contrast. Coronal 2-D reformatted  images were submitted to further facilitate diagnostic accuracy and  treatment planning.     The radiation dose reduction device was turned on for each scan per the  ALARA (As Low as Reasonably Achievable) protocol.     COMPARISON: NONE     FINDINGS: There are small bilateral pleural effusions left greater than  right. No filling defect seen of the pulmonary arteries to suggest  evidence of pulmonary embolism. The cardiac chambers are enlarged. There  is no pericardial effusion. No bulky hilar or axillary lymphadenopathy.  Vascular calcifications seen within the thoracic aorta. No pneumothorax.  No parenchymal consolidation, pulmonary mass or other nodule identified.  Degenerative changes seen within the spine. The visualized portions of  the upper abdomen are unremarkable. Degenerative changes seen within the  spine.       Impression:       Enlargement of the heart with small bilateral pleural  effusions left greater than right. No evidence of filling defect to  suggest pulmonary embolism.  No acute parenchymal disease.     D:  07/09/2018  E:  07/09/2018     This report was finalized on 7/9/2018 2:50 PM by Dr. Dulce Maria Us MD.       MRI Lumbar Spine Without Contrast [[id number]] Collected:  07/09/18 1123     Updated:  07/09/18 1303    Narrative:        EXAMINATION: MRI LUMBAR SPINE WO CONTRAST-07/09/2018:     INDICATION: Bilateral leg weakness L>R, loss of ability to walk, low  back pain radiating into the left leg.     TECHNIQUE: Routine multiplanar imaging was obtained of the lumbar spine  without the administration of Gadolinium contrast.     COMPARISON: NONE.     FINDINGS: The vertebral body height is preserved with normal signal  intensity within the vertebral bodies. Anterior spurring and bridging  osteophyte formation seen from L1 through L5. There are degenerative  changes seen within the endplates inferiorly at the L3 and L4 levels.  Some disc desiccation seen at the L3/L4, L4/L5 and L5/S1 levels. There  is slight curvature of the spine with convexity to the right. Normal  signal intensity within the conus. The spinal cord terminates at the L1  level.     Axial imaging reveals evidence of a broad-based disc bulge at the L2/L3  level with some lateralization to the left creating narrowing of the  left neural foramina. Degenerative changes seen within the posterior  facets. Nerve root contact and compromise on the left cannot be  excluded. No significant central spinal canal stenosis.     At the L3/L4 level there is a broad-based disc bulge creating some  narrowing of the neural foramina bilaterally and moderate central spinal  canal stenosis. Degenerative changes seen within the posterior facets.  No significant nerve root compromise identified.     At the L4/L5 level there is a broad-based disc bulge in combination with  posterior facet hypertrophy creating narrowing of the neural foramina  bilaterally and moderate central spinal canal stenosis. No significant  nerve root compromise.     At the L5/S1 level there is a broad-based disc bulge with no significant  central spinal canal stenosis or nerve root compromise. No  neuroforaminal narrowing identified.       Impression:       Narrowing of the neural foramina bilaterally at the L3/L4  and L4/L5  level. Narrowing as well seen at the left neural foramina at  the L2/L3 level. No definite nerve root compromise identified. Contact  cannot be excluded on the left at the L2/L3 level.     D:  07/09/2018  E:  07/09/2018     This report was finalized on 7/9/2018 1:01 PM by Dr. Dulce Maria Us MD.       XR Chest 1 View [[id number]] Collected:  07/09/18 1004     Updated:  07/09/18 1029    Narrative:       EXAMINATION: XR CHEST 1 VW-      INDICATION: Shortness of air triage protocol.      COMPARISON: None.     FINDINGS: Portable chest reveals heart to be enlarged. Underlying  chronic and emphysematous change is seen within the lung fields  bilaterally. Degenerative change is seen within the spine. Pulmonary  vascularity is within normal limits.  No pleural effusion or  pneumothorax. Pulmonary vascularity is prominent.           Impression:       Heart is enlarged with slight prominence of the pulmonary  vascularity with no acute parenchymal disease.     D:  07/09/2018  E:  07/09/2018     This report was finalized on 7/9/2018 10:27 AM by Dr. Dulce Maria Us MD.           Results for orders placed during the hospital encounter of 07/09/18   Adult Transthoracic Echo Complete W/ Cont if Necessary Per Protocol    Narrative · Moderate pulmonic valve regurgitation is present.  · Left atrial cavity size is mild-to-moderately dilated.  · Mild mitral valve regurgitation is present.  · Moderate to severe tricuspid valve regurgitation is present.  · Calculated right ventricular systolic pressure from tricuspid   regurgitation is 66 mmHg.  · Right ventricular cavity is mild-to-moderately dilated.  · Moderate aortic valve regurgitation is present.  · Left ventricular systolic function is normal. Estimated EF = 70%.  · There is no evidence of pericardial effusion.  · Moderate to severe pulmonary hypertension is present.  · Mild MAC is present.  · The aortic valve exhibits sclerosis.        Discharge Details        Discharge  Medications      New Medications      Instructions Start Date   lisinopril 5 MG tablet  Commonly known as:  PRINIVIL,ZESTRIL   5 mg, Oral, Daily   Start Date:  7/13/2018        Continue These Medications      Instructions Start Date   allopurinol 100 MG tablet  Commonly known as:  ZYLOPRIM   100 mg, Oral, 2 Times Daily      furosemide 20 MG tablet  Commonly known as:  LASIX   40 mg, Oral, Daily      metoprolol succinate  MG 24 hr tablet  Commonly known as:  TOPROL-XL   200 mg, Oral, Daily      traMADol-acetaminophen 37.5-325 MG per tablet  Commonly known as:  ULTRACET   1 tablet, Oral, Daily PRN      warfarin 1 MG tablet  Commonly known as:  COUMADIN   1 mg, Oral, Daily Warfarin         Stop These Medications    amLODIPine 5 MG tablet  Commonly known as:  NORVASC          Discharge Disposition:  Home or Self Care    Discharge Diet:  Diet Instructions     Diet: Regular, Cardiac; Thin       Discharge Diet:   Regular  Cardiac       Fluid Consistency:  Thin        Discharge Activity:   Activity Instructions     Activity as Tolerated           Code Status/Level of Support:  Code Status and Medical Interventions:   Ordered at: 07/09/18 144     Level Of Support Discussed With:    Patient     Code Status:    CPR     Medical Interventions (Level of Support Prior to Arrest):    Full     No future appointments.    Additional Instructions for the Follow-ups that You Need to Schedule     Ambulatory Referral to Home Health    As directed      Face to Face Visit Date:  7/12/2018    Follow-up Provider for Plan of Care?:  I treated the patient in an acute care facility and will not continue treatment after discharge.    Follow-up Provider:  SHARATH SALINAS [[id number]]    Reason/Clinical Findings:  extended hospitalization, Acute Diastolic Heart Failure, Chronic A-fib, Chronic Coumadin Therapy    Describe mobility limitations that make leaving home difficult:  extended hospitalization, Acute Diastolic Heart Failure, Chronic  A-fib, Chronic Coumadin Therapy, weakness, shortness of breath, oxygen therapy initiated for home use.    Nursing/Therapeutic Services Requested:  Skilled Nursing Physical Therapy Occupational Therapy    Skilled nursing orders:  CHF management O2 instruction Cardiopulmonary assessments    PT orders:  Strengthening    Occupational orders:  Strengthening Activities of daily living    Frequency:  Other (2-3xweek)         Discharge Follow-up with Specialty: Follow up with Dr. Rodriguez in 2-3 weeks    As directed      Specialty:  Follow up with Dr. Rodriguez in 2-3 weeks             Time Spent on Discharge: 40 minutes    Electronically signed by Ronda Pinon PA-C, 07/12/18, 12:32 PM.

## 2018-07-12 NOTE — DISCHARGE PLACEMENT REQUEST
"Will need portable tank for transportation home  Patient is being discharged home today, 7/12  [facility], Room 484    Thank You,  Karen Gay, RN    [phone]    Pedro Stewart  (78 y.o. Female)     Date of Birth Social Security Number Address Home Phone MRN    1940  [address] [facility] [address] [phone] [id number]    Alevism Marital Status          Mormonism        Admission Date Admission Type Admitting Provider Attending Provider Department, Room/Bed    7/9/18 Emergency Loera, MD Martin Power Mayuri V, MD [address], S[address]/1    Discharge Date Discharge Disposition Discharge Destination                       Attending Provider:  Sejal DE LA ROSA MD    Allergies:  No Known Allergies    Isolation:  None   Infection:  None   Code Status:  CPR    Ht:  170 cm (66.93\")   Wt:  93.9 kg (207 lb)    Admission Cmt:  None   Principal Problem:  Acute diastolic congestive heart failure (CMS/HCC) [I50.9]                 Active Insurance as of 7/9/2018     Primary Coverage     Payor Plan Insurance Group Employer/Plan Group    HUMANA MEDICARE REPLACEMENT HUMANA MEDICARE REPL [id number]     Payor Plan Address Payor Plan Phone Number Effective From Effective To    [address] [address] [phone] 1/1/2018     [facility] [address]       Subscriber Name Subscriber Birth Date Member ID       PEDRO STEWART 1940 [id number]                 Emergency Contacts      (Rel.) Home Phone Work Phone Mobile Phone    Wally Stewart (Power of ) [phone] -- --    PatDarlene (Relative) [phone] -- --            Insurance Information                HUMANA MEDICARE REPLACEMENT/HUMANA MEDICARE REPL Phone: [phone]    Subscriber: Pat Pedro Subscriber#: [id number]    Group#: [id number] Precert#:           [address] [address]  Dept. Phone:  [phone]  Dept. Fax:   Date Ordered: Jul 12, 2018 " "        Patient:  Zee Stewart MRN:  [id number]   [address] [facility] [address] :  1940  SSN:    Phone: [phone] Sex:  F     Weight: 93.9 kg (207 lb)         Ht Readings from Last 1 Encounters:   18 170 cm (66.93\")         Oxygen Therapy         (Order ID: [id number])    Diagnosis:  Acute congestive heart failure, unspecified congestive heart failure type (CMS/HCC) (I50.9 [ICD-10-CM] 428.0 [ICD-9-CM])  Hypoxia (R09.02 [ICD-10-CM] 799.02 [ICD-9-CM])  Deterioration in ability to walk (R26.2 [ICD-10-CM] 781.2 [ICD-9-CM])  Impaired mobility and ADLs (Z74.09 [ICD-10-CM] 799.89 [ICD-9-CM])  Impaired functional mobility, balance, gait, and endurance (Z74.09 [ICD-10-CM] V49.89 [ICD-9-CM])   Quantity:  1     Delivery Modality: Nasal Cannula  Liters Per Minute: 2  Duration: Continuous  Equipment:  Oxygen Concentrator &  &  Portable Gaseous Oxygen System & Portable Oxygen Contents Gaseous  The face to face evaluation was performed on: 2018  Length of Need (99 Months = Lifetime): 99 Months = Lifetime        Authorizing Provider's Phone: [phone]         Verbal Order Mode: Telephone with readback   Authorizing Provider: Sejal DE LA ROSA MD  Authorizing Provider's NPI: [id number]     Order Entered By: Lyla Gay RN 2018  9:28 AM     Electronically signed by:  Sejal DE LA ROSA MD 2018  10:00 AM                History & Physical      Samantha Loera MD at 2018  2:45 PM              [facility] Medicine Services  HISTORY AND PHYSICAL    Patient Name: Zee Stewart  : 1940  MRN: [id number]  Primary Care Physician: Ramyond Becker MD    Subjective   Subjective     Chief Complaint:  SOA, back and left leg pain    HPI:  Zee Stewart is a 78 y.o. female with PMH significant for chronic atrial fib (on warfarin and followed by Dr. Rodriguez), HTN, gout and recent celluitis BLE, now improved (keflex prescribed ). She comes " "today because she has been more SOA than usual but has had a marked increase in her SOA in the last 24 hours associated with wheezing. She does not smoke and has not ever been diagnosed with COPD or asthma.  She also reports increased BLE edema, abdominal \"tightness\" and orthopnea.  She is particularly SOA with minimal exertion.  She believes her last ECHO was 2-3 years ago with Dr. Rodriguez and does not remember any mention of heart failure.  She has not had chest pain, cough, fever, or syncope.      In addition, the patient fell about 4 days ago (she was getting out of bed and attempted to hold onto the chest of drawers but missed).  She says this fall has \"thrown her back out.\"  She is having pain gerson in her back, through her left hip and into her left leg.  She has been wheeling around the house in a wheelchair due to her inability to walk (due to pain, not weakness).  She does not have saddle anesthesia and has not lost control of her bowels/bladder.  She was in fact able to put her own pants on today so while she is still having pain and debility, she is a little better.  She has taken some tramadol for this pain.    Here in the ER, , WBC 11.64K, H/H 9.8/32.0, K 3.3, glc 159, creat 1.15, TSH 2.0, uric acid 8.4, ESR 39, .  CTA - no PE, some heart enlargement with small bilateral pleural effusions.    L spine MRI: Narrowing of the neural foramina bilaterally at the L3/L4  and L4/L5 level. Narrowing as well seen at the left neural foramina at  the L2/L3 level. No definite nerve root compromise identified. Contact  cannot be excluded on the left at the L2/L3 level.    Review of Systems   Constitutional: Positive for activity change and fatigue. Negative for chills and fever.   HENT: Negative.    Eyes: Negative.    Respiratory: Positive for shortness of breath and wheezing.    Cardiovascular: Positive for leg swelling. Negative for chest pain and palpitations.   Gastrointestinal: Positive for abdominal " distention. Negative for diarrhea, nausea and vomiting.   Endocrine: Negative.    Genitourinary: Negative.    Musculoskeletal:        Back pain that radiated to left leg   Skin: Negative.    Allergic/Immunologic: Negative.    Neurological: Negative.    Hematological: Negative.    Psychiatric/Behavioral: Negative.           Otherwise 10-system ROS reviewed and is negative except as mentioned in the HPI.    Personal History     Past Medical History:   Diagnosis Date   • Arthritis    • Atrial fibrillation (CMS/HCC)    • Cellulitis    • CHF (congestive heart failure) (CMS/HCC)    • Coronary artery disease    • Gout    • Hypertension        Past Surgical History:   Procedure Laterality Date   • ABDOMINAL SURGERY     • CYSTOSCOPY W/ LASER LITHOTRIPSY     • TUBAL ABDOMINAL LIGATION     • URETEROSCOPY STENT INSERTION         Family History: family history includes Heart disease in her father.     Social History:  reports that she has quit smoking. She does not have any smokeless tobacco history on file. She reports that she does not drink alcohol or use drugs.  Social History     Social History Narrative    Lives alone       Medications:  Prescriptions Prior to Admission   Medication Sig Dispense Refill Last Dose   • allopurinol (ZYLOPRIM) 100 MG tablet Take 100 mg by mouth 2 (Two) Times a Day.   7/9/2018 at Unknown time   • amLODIPine (NORVASC) 5 MG tablet Take 5 mg by mouth Daily.   7/9/2018 at Unknown time   • furosemide (LASIX) 20 MG tablet Take 40 mg by mouth Daily.   7/8/2018 at Unknown time   • metoprolol succinate XL (TOPROL-XL) 200 MG 24 hr tablet Take 200 mg by mouth Daily.   7/9/2018 at Unknown time   • traMADol-acetaminophen (ULTRACET) 37.5-325 MG per tablet Take 1 tablet by mouth Daily As Needed for Moderate Pain .   7/8/2018 at Unknown time   • warfarin (COUMADIN) 1 MG tablet Take 1 mg by mouth Daily.   7/9/2018 at Unknown time       No Known Allergies    Objective   Objective     Vital Signs:   Temp:  [98.1  °F (36.7 °C)-98.4 °F (36.9 °C)] 98.1 °F (36.7 °C)  Heart Rate:  [] 79  Resp:  [20-22] 22  BP: (111-186)/(50-84) 154/82        Physical Exam   Constitutional: Mild dyspnea, awake, alert  Eyes: PERRLA, sclerae anicteric, no conjunctival injection  HENT: NCAT, mucous membranes moist  Neck: Supple, no thyromegaly, no lymphadenopathy, trachea midline  Respiratory: Fine rales bases nonlabored respirations   Cardiovascular: irreg/irreg, no murmurs, rubs, or gallops, palpable pedal pulses bilaterally  Gastrointestinal: Positive bowel sounds, mild distension, obese  Musculoskeletal: 1-2+ bilateral ankle edema, no clubbing or cyanosis to extremities  Psychiatric: Appropriate affect, cooperative  Neurologic: Oriented x 3, strength symmetric in all extremities, Cranial Nerves grossly intact to confrontation, speech clear  Skin: No rashes      Results Reviewed:  I have personally reviewed current lab, radiology, and data and agree.      Results from last 7 days  Lab Units 07/09/18  0905 07/09/18  0904   WBC 10*3/mm3  --  11.64*   HEMOGLOBIN g/dL  --  9.8*   HEMATOCRIT %  --  32.0*   PLATELETS 10*3/mm3  --  243   INR  1.95*  --        Results from last 7 days  Lab Units 07/09/18  0905   SODIUM mmol/L 144   POTASSIUM mmol/L 3.3*   CHLORIDE mmol/L 108   CO2 mmol/L 26.0   BUN mg/dL 22   CREATININE mg/dL 1.15   GLUCOSE mg/dL 159*   CALCIUM mg/dL 8.3*   ALT (SGPT) U/L 9   AST (SGOT) U/L 19     Estimated Creatinine Clearance: 48.3 mL/min (by C-G formula based on SCr of 1.15 mg/dL).  Brief Urine Lab Results     None        BNP   Date Value Ref Range Status   07/09/2018 420.0 (H) 0.0 - 100.0 pg/mL Final     Comment:     Results may be falsely decreased if patient taking Biotin.     Imaging Results (last 24 hours)     Procedure Component Value Units Date/Time    CT Angiogram Chest With Contrast [[id number]] Collected:  07/09/18 1412     Updated:  07/09/18 1452    Narrative:       EXAMINATION: CT ANGIOGRAM CHEST W CONTRAST-  07/09/2018     INDICATION: I50.9-Heart failure, unspecified; R09.02-Hypoxemia;  M60.869-Other myositis, unspecified lower leg; E79.0-Hyperuricemia  without signs of inflammatory arthritis and tophaceous disease;  R79.89-Other specified abnormal findings of blood chemistry; Z79.01-Long  term (current) use of anticoagulants; I48.2-Chronic atrial fibrillation;  M51.36-Other intervertebral disc degeneration, lumbar; shortness of  breath, leg swelling     TECHNIQUE: Multiple axial CT imaging was obtained of the chest following  the administration of intravenous contrast. Coronal 2-D reformatted  images were submitted to further facilitate diagnostic accuracy and  treatment planning.     The radiation dose reduction device was turned on for each scan per the  ALARA (As Low as Reasonably Achievable) protocol.     COMPARISON: NONE     FINDINGS: There are small bilateral pleural effusions left greater than  right. No filling defect seen of the pulmonary arteries to suggest  evidence of pulmonary embolism. The cardiac chambers are enlarged. There  is no pericardial effusion. No bulky hilar or axillary lymphadenopathy.  Vascular calcifications seen within the thoracic aorta. No pneumothorax.  No parenchymal consolidation, pulmonary mass or other nodule identified.  Degenerative changes seen within the spine. The visualized portions of  the upper abdomen are unremarkable. Degenerative changes seen within the  spine.       Impression:       Enlargement of the heart with small bilateral pleural  effusions left greater than right. No evidence of filling defect to  suggest pulmonary embolism.  No acute parenchymal disease.     D:  07/09/2018  E:  07/09/2018        This report was finalized on 7/9/2018 2:50 PM by Dr. Dulce Maria Us MD.       MRI Lumbar Spine Without Contrast [[id number]] Collected:  07/09/18 1123     Updated:  07/09/18 1303    Narrative:       EXAMINATION: MRI LUMBAR SPINE WO CONTRAST-07/09/2018:     INDICATION:  Bilateral leg weakness L>R, loss of ability to walk, low  back pain radiating into the left leg.     TECHNIQUE: Routine multiplanar imaging was obtained of the lumbar spine  without the administration of Gadolinium contrast.     COMPARISON: NONE.     FINDINGS: The vertebral body height is preserved with normal signal  intensity within the vertebral bodies. Anterior spurring and bridging  osteophyte formation seen from L1 through L5. There are degenerative  changes seen within the endplates inferiorly at the L3 and L4 levels.  Some disc desiccation seen at the L3/L4, L4/L5 and L5/S1 levels. There  is slight curvature of the spine with convexity to the right. Normal  signal intensity within the conus. The spinal cord terminates at the L1  level.     Axial imaging reveals evidence of a broad-based disc bulge at the L2/L3  level with some lateralization to the left creating narrowing of the  left neural foramina. Degenerative changes seen within the posterior  facets. Nerve root contact and compromise on the left cannot be  excluded. No significant central spinal canal stenosis.     At the L3/L4 level there is a broad-based disc bulge creating some  narrowing of the neural foramina bilaterally and moderate central spinal  canal stenosis. Degenerative changes seen within the posterior facets.  No significant nerve root compromise identified.     At the L4/L5 level there is a broad-based disc bulge in combination with  posterior facet hypertrophy creating narrowing of the neural foramina  bilaterally and moderate central spinal canal stenosis. No significant  nerve root compromise.     At the L5/S1 level there is a broad-based disc bulge with no significant  central spinal canal stenosis or nerve root compromise. No  neuroforaminal narrowing identified.       Impression:       Narrowing of the neural foramina bilaterally at the L3/L4  and L4/L5 level. Narrowing as well seen at the left neural foramina at  the L2/L3 level.  No definite nerve root compromise identified. Contact  cannot be excluded on the left at the L2/L3 level.     D:  07/09/2018  E:  07/09/2018            This report was finalized on 7/9/2018 1:01 PM by Dr. Dulce Maria Us MD.       XR Chest 1 View [[id number]] Collected:  07/09/18 1004     Updated:  07/09/18 1029    Narrative:       EXAMINATION: XR CHEST 1 VW-      INDICATION: Shortness of air triage protocol.      COMPARISON: None.     FINDINGS: Portable chest reveals heart to be enlarged. Underlying  chronic and emphysematous change is seen within the lung fields  bilaterally. Degenerative change is seen within the spine. Pulmonary  vascularity is within normal limits.  No pleural effusion or  pneumothorax. Pulmonary vascularity is prominent.           Impression:       Heart is enlarged with slight prominence of the pulmonary  vascularity with no acute parenchymal disease.     D:  07/09/2018  E:  07/09/2018     This report was finalized on 7/9/2018 10:27 AM by Dr. Dulce Maria Us MD.                Assessment/Plan   Assessment / Plan     Hospital Problem List     * (Principal)Acute congestive heart failure (CMS/HCC)    Chronic atrial fibrillation (CMS/HCC)    Hypokalemia    Gout (Chronic)    Hypertension (Chronic)    Elevated CK    Anemia    Leukocytosis    Warfarin anticoagulation (Chronic)    Hyperglycemia        Assessment & Plan:  Ms. Stewart is a 78 year old woman with PMH significant for HTN, chronic atrial fib (on warfarin and followed by Dr. Rodriguez), and gout.  She presents to [facility] ER with acute heart failure and back pain    ECHO  20 mg lasix IV given in ER, will initiate 20 mg IV lasix BID for now and  hospitalist to check clinical response in AM  Continue bblocker  D/c norvasc and start lisinopril  TSH wnl  Will defer cardio consult for now, would suggest differentiating severity with  echo, diurese and follow up with Dr. Rodriguez    Pharmacy to dose warfarin  Atrial fib is rate controlled    K+ was  replaced in ER, recheck in AM    Elevated CK - unsure etilogy - perhaps from her fall 4 days ago?  Or from  decreased activity?  Not on a statin and no muscle tenderness.    Anemia -- AM studies and guiac    Leukocytosis - Check UA    Hyperglycemia:  Check A1C    Acute back pain -- continue tramadol, add lidoderm patch, heat, PT/OT.  Consider F/U with neurosurgeon after d/c but has not had a trial of conservative management.      DVT prophylaxis:LMWH    CODE STATUS:    Code Status and Medical Interventions:   Ordered at: 18 1443     Level Of Support Discussed With:    Patient     Code Status:    CPR     Medical Interventions (Level of Support Prior to Arrest):    Full       INPATIENT status due to the need for care which can only be reasonably provided in an hospital setting such as aggressive/expedited ancillary services, the necessity for IV medications, close physician monitoring.  In such, I feel patient’s risk for adverse outcomes and need for care warrant INPATIENT evaluation and predict the patient’s care encounter to likely last beyond 2 midnights.    Electronically signed by Samantha Loera MD, 18, 2:45 PM.          Electronically signed by Samantha Loera MD at 2018  9:42 PM          Physician Progress Notes (last 72 hours) (Notes from 2018 10:21 AM through 2018 10:21 AM)      Sejal DE LA ROSA MD at 2018  9:23 AM              [facility] Medicine Services  PROGRESS NOTE    Patient Name: Zee Stewart  : 1940  MRN: [id number]    Date of Admission: 2018  Length of Stay: 1  Primary Care Physician: Raymond Becker MD    Subjective   Subjective     CC:  F/U SOA, back pain    HPI:  Patient seen this morning. Off oxygen, maintaining O2 sats in upper 90s. Her breathing is much better, not as short of breath when ambulating to bathroom and back. She noticed she gained 2 lbs since yesterday however, is concerned. Ambulating well with walker and PT.      Review of Systems  Gen-no fevers, no chills  CV-no chest pain, no palpitations  Resp-no cough, improved dyspnea  GI-no N/V/D, no abd pain    Otherwise ROS is negative except as mentioned in the HPI.    Objective   Objective     Vital Signs:   Temp:  [97.5 °F (36.4 °C)-97.8 °F (36.6 °C)] 97.8 °F (36.6 °C)  Heart Rate:  [74-95] 74  Resp:  [16-18] 16  BP: (134-138)/(65-82) 138/65        Physical Exam:  Gen-no acute distress  CV-irregular, S1 S2 normal, no m/r/g  Resp-decreased at the bases bilaterally, normal effort  Abd-soft, NT, ND, +BS  Ext-1+ BLE edema, improved  Neuro-A&Ox3, no focal deficits  Psych-appropriate mood      Results Reviewed:  I have personally reviewed current lab, radiology, and data and agree.      Results from last 7 days  Lab Units 07/11/18  0330 07/10/18  0431 07/10/18  0430 07/09/18  1458  07/09/18  0904   WBC 10*3/mm3 11.89*  --  8.10  --   --  11.64*   HEMOGLOBIN g/dL 8.8*  --  9.3*  --   --  9.8*   HEMATOCRIT % 28.4*  --  30.9*  --   --  32.0*   PLATELETS 10*3/mm3 245  --  230  --   --  243   INR  2.36* 1.98*  --  1.96*  < >  --    < > = values in this interval not displayed.    Results from last 7 days  Lab Units 07/11/18  0330 07/10/18  0430 07/09/18  0905   SODIUM mmol/L 141 144 144   POTASSIUM mmol/L 4.0 4.3 3.3*   CHLORIDE mmol/L 107 113* 108   CO2 mmol/L 25.0 28.0 26.0   BUN mg/dL 36* 23 22   CREATININE mg/dL 1.21 1.13 1.15   GLUCOSE mg/dL 108* 139* 159*   CALCIUM mg/dL 7.7* 8.3* 8.3*   ALT (SGPT) U/L  --  8 9   AST (SGOT) U/L  --  18 19     Estimated Creatinine Clearance: 45.6 mL/min (by C-G formula based on SCr of 1.21 mg/dL).  BNP   Date Value Ref Range Status   07/11/2018 256.0 (H) 0.0 - 100.0 pg/mL Final     Comment:     Results may be falsely decreased if patient taking Biotin.       Microbiology Results Abnormal     None          Imaging Results (last 24 hours)     ** No results found for the last 24 hours. **        Results for orders placed during the hospital encounter of  07/09/18   Adult Transthoracic Echo Complete W/ Cont if Necessary Per Protocol    Narrative · Moderate pulmonic valve regurgitation is present.  · Left atrial cavity size is mild-to-moderately dilated.  · Mild mitral valve regurgitation is present.  · Moderate to severe tricuspid valve regurgitation is present.  · Calculated right ventricular systolic pressure from tricuspid   regurgitation is 66 mmHg.  · Right ventricular cavity is mild-to-moderately dilated.  · Moderate aortic valve regurgitation is present.  · Left ventricular systolic function is normal. Estimated EF = 70%.  · There is no evidence of pericardial effusion.  · Moderate to severe pulmonary hypertension is present.  · Mild MAC is present.  · The aortic valve exhibits sclerosis.          I have reviewed the medications.    Assessment/Plan   Assessment / Plan     Hospital Problem List     * (Principal)Acute diastolic congestive heart failure (CMS/HCC)    Chronic atrial fibrillation (CMS/HCC)    Hypokalemia    Gout (Chronic)    Hypertension (Chronic)    Elevated CK    Anemia    Leukocytosis    Warfarin anticoagulation (Chronic)    Hyperglycemia    Pulmonary hypertension    Moderate to severe tricuspid regurgitation    Back pain due to recent fall             Brief Hospital Course to date:  Zee Stewart is a 78 y.o. female with hx of HTN, chronic Afib on Coumadin (followed by Dr. Rodriguez), and gout presents due to increased SOA and BLE edema. She also had a recent fall and has had back pain and difficulty walking. In the ER, labs showed  and CTA chest showed cardiac enlargement and bilateral pleural effusions, no PE. Admitted to hospitalists for heart failure management.       Assessment & Plan:  --Continue diuresis with 20 mg IV Lasix BID. Strict I&O and daily weights. Can probably switch to oral Lasix tomorrow.   --Echo reviewed. Shows normal EF but moderate to severe TR and pulmonary HTN. I wonder if she has untreated KATIA (nursing staff has  noted nocturnal apnea and desaturation). Recommend outpatient sleep study after discharge as well as close follow up with Dr. Rodriguez.   --MRI L spine shows some narrowing but no severe stenosis or acute fractures. Continue PT/OT and supportive care. Pain improved, did not want Lidoderm patch this morning.   --Plan home tomorrow with HHPT if continues to improve.     DVT Prophylaxis:  Coumadin    CODE STATUS:   Code Status and Medical Interventions:   Ordered at: 18 1443     Level Of Support Discussed With:    Patient     Code Status:    CPR     Medical Interventions (Level of Support Prior to Arrest):    Full       Disposition: I expect the patient to be discharged home in ~1 day    Electronically signed by Sejal Salazar MD, 18, 9:25 AM.        Electronically signed by Sejal DE LA ROSA MD at 2018  9:25 AM     Sejal DE LA ROSA MD at 7/10/2018 12:59 PM              [facility] Medicine Services  PROGRESS NOTE    Patient Name: Zee Stewart  : 1940  MRN: [id number]    Date of Admission: 2018  Length of Stay: 1  Primary Care Physician: Raymond Becker MD    Subjective   Subjective     CC:  F/U SOA, back pain    HPI:  Patient seen this morning. Son just arrived. She states her breathing is better, as well as her back and leg pain. She was able to walk with a walker this morning with therapy.     Review of Systems  Gen-no fevers, no chills  CV-no chest pain, no palpitations  Resp-no cough, improved dyspnea  GI-no N/V/D, no abd pain    Otherwise ROS is negative except as mentioned in the HPI.    Objective   Objective     Vital Signs:   Temp:  [98 °F (36.7 °C)-98.4 °F (36.9 °C)] 98 °F (36.7 °C)  Heart Rate:  [72-91] 91  Resp:  [18-22] 18  BP: (115-160)/(45-82) 126/45        Physical Exam:  Gen-no acute distress  CV-irregular, S1 S2 normal, no m/r/g  Resp-decreased at the bases bilaterally, mild expiratory wheezes  Abd-soft, NT, ND, +BS  Ext-1-2+ BLE edema  Neuro-A&Ox3,  no focal deficits  Psych-appropriate mood      Results Reviewed:  I have personally reviewed current lab, radiology, and data and agree.      Results from last 7 days  Lab Units 07/10/18  0431 07/10/18  0430 07/09/18  1458 07/09/18  0905 07/09/18  0904   WBC 10*3/mm3  --  8.10  --   --  11.64*   HEMOGLOBIN g/dL  --  9.3*  --   --  9.8*   HEMATOCRIT %  --  30.9*  --   --  32.0*   PLATELETS 10*3/mm3  --  230  --   --  243   INR  1.98*  --  1.96* 1.95*  --        Results from last 7 days  Lab Units 07/10/18  0430 07/09/18  0905   SODIUM mmol/L 144 144   POTASSIUM mmol/L 4.3 3.3*   CHLORIDE mmol/L 113* 108   CO2 mmol/L 28.0 26.0   BUN mg/dL 23 22   CREATININE mg/dL 1.13 1.15   GLUCOSE mg/dL 139* 159*   CALCIUM mg/dL 8.3* 8.3*   ALT (SGPT) U/L 8 9   AST (SGOT) U/L 18 19     Estimated Creatinine Clearance: 48.6 mL/min (by C-G formula based on SCr of 1.13 mg/dL).  BNP   Date Value Ref Range Status   07/09/2018 420.0 (H) 0.0 - 100.0 pg/mL Final     Comment:     Results may be falsely decreased if patient taking Biotin.       Microbiology Results Abnormal     None          Imaging Results (last 24 hours)     Procedure Component Value Units Date/Time    CT Angiogram Chest With Contrast [[id number]] Collected:  07/09/18 1412     Updated:  07/09/18 1452    Narrative:       EXAMINATION: CT ANGIOGRAM CHEST W CONTRAST- 07/09/2018     INDICATION: I50.9-Heart failure, unspecified; R09.02-Hypoxemia;  M60.869-Other myositis, unspecified lower leg; E79.0-Hyperuricemia  without signs of inflammatory arthritis and tophaceous disease;  R79.89-Other specified abnormal findings of blood chemistry; Z79.01-Long  term (current) use of anticoagulants; I48.2-Chronic atrial fibrillation;  M51.36-Other intervertebral disc degeneration, lumbar; shortness of  breath, leg swelling     TECHNIQUE: Multiple axial CT imaging was obtained of the chest following  the administration of intravenous contrast. Coronal 2-D reformatted  images were submitted to  further facilitate diagnostic accuracy and  treatment planning.     The radiation dose reduction device was turned on for each scan per the  ALARA (As Low as Reasonably Achievable) protocol.     COMPARISON: NONE     FINDINGS: There are small bilateral pleural effusions left greater than  right. No filling defect seen of the pulmonary arteries to suggest  evidence of pulmonary embolism. The cardiac chambers are enlarged. There  is no pericardial effusion. No bulky hilar or axillary lymphadenopathy.  Vascular calcifications seen within the thoracic aorta. No pneumothorax.  No parenchymal consolidation, pulmonary mass or other nodule identified.  Degenerative changes seen within the spine. The visualized portions of  the upper abdomen are unremarkable. Degenerative changes seen within the  spine.       Impression:       Enlargement of the heart with small bilateral pleural  effusions left greater than right. No evidence of filling defect to  suggest pulmonary embolism.  No acute parenchymal disease.     D:  07/09/2018  E:  07/09/2018        This report was finalized on 7/9/2018 2:50 PM by Dr. Dulce Maria Us MD.       MRI Lumbar Spine Without Contrast [[id number]] Collected:  07/09/18 1123     Updated:  07/09/18 1303    Narrative:       EXAMINATION: MRI LUMBAR SPINE WO CONTRAST-07/09/2018:     INDICATION: Bilateral leg weakness L>R, loss of ability to walk, low  back pain radiating into the left leg.     TECHNIQUE: Routine multiplanar imaging was obtained of the lumbar spine  without the administration of Gadolinium contrast.     COMPARISON: NONE.     FINDINGS: The vertebral body height is preserved with normal signal  intensity within the vertebral bodies. Anterior spurring and bridging  osteophyte formation seen from L1 through L5. There are degenerative  changes seen within the endplates inferiorly at the L3 and L4 levels.  Some disc desiccation seen at the L3/L4, L4/L5 and L5/S1 levels. There  is slight  curvature of the spine with convexity to the right. Normal  signal intensity within the conus. The spinal cord terminates at the L1  level.     Axial imaging reveals evidence of a broad-based disc bulge at the L2/L3  level with some lateralization to the left creating narrowing of the  left neural foramina. Degenerative changes seen within the posterior  facets. Nerve root contact and compromise on the left cannot be  excluded. No significant central spinal canal stenosis.     At the L3/L4 level there is a broad-based disc bulge creating some  narrowing of the neural foramina bilaterally and moderate central spinal  canal stenosis. Degenerative changes seen within the posterior facets.  No significant nerve root compromise identified.     At the L4/L5 level there is a broad-based disc bulge in combination with  posterior facet hypertrophy creating narrowing of the neural foramina  bilaterally and moderate central spinal canal stenosis. No significant  nerve root compromise.     At the L5/S1 level there is a broad-based disc bulge with no significant  central spinal canal stenosis or nerve root compromise. No  neuroforaminal narrowing identified.       Impression:       Narrowing of the neural foramina bilaterally at the L3/L4  and L4/L5 level. Narrowing as well seen at the left neural foramina at  the L2/L3 level. No definite nerve root compromise identified. Contact  cannot be excluded on the left at the L2/L3 level.     D:  07/09/2018  E:  07/09/2018            This report was finalized on 7/9/2018 1:01 PM by Dr. Dulce Maria Us MD.           Results for orders placed during the hospital encounter of 07/09/18   Adult Transthoracic Echo Complete W/ Cont if Necessary Per Protocol    Narrative · Moderate pulmonic valve regurgitation is present.  · Left atrial cavity size is mild-to-moderately dilated.  · Mild mitral valve regurgitation is present.  · Moderate to severe tricuspid valve regurgitation is present.  ·  Calculated right ventricular systolic pressure from tricuspid   regurgitation is 66 mmHg.  · Right ventricular cavity is mild-to-moderately dilated.  · Moderate aortic valve regurgitation is present.  · Left ventricular systolic function is normal. Estimated EF = 70%.  · There is no evidence of pericardial effusion.  · Moderate to severe pulmonary hypertension is present.  · Mild MAC is present.  · The aortic valve exhibits sclerosis.          I have reviewed the medications.    Assessment/Plan   Assessment / Plan     Hospital Problem List     * (Principal)Acute diastolic congestive heart failure (CMS/HCC)    Chronic atrial fibrillation (CMS/HCC)    Hypokalemia    Gout (Chronic)    Hypertension (Chronic)    Elevated CK    Anemia    Leukocytosis    Warfarin anticoagulation (Chronic)    Hyperglycemia    Pulmonary hypertension    Moderate to severe tricuspid regurgitation    Back pain due to recent fall             Brief Hospital Course to date:  Zee Stewart is a 78 y.o. female with hx of HTN, chronic Afib on Coumadin (followed by Dr. Rodriguez), and gout presents due to increased SOA and BLE edema. She also had a recent fall and has had back pain and difficulty walking. In the ER, labs showed  and CTA chest showed cardiac enlargement and bilateral pleural effusions, no PE. Admitted to hospitalists for heart failure management.       Assessment & Plan:  --Continue diuresis with 20 mg IV Lasix BID. Strict I&O and daily weights.   --Echo reviewed. Shows normal EF but moderate to severe TR and pulmonary HTN. I wonder also if she has untreated KATIA (nursing staff has noted nocturnal apnea and desaturation). Recommend outpatient sleep study after discharge as well as close follow up with Dr. Rodriguez.   --MRI L spine shows some narrowing but no severe stenosis or acute fractures. Continue PT/OT and supportive care.   --Discussed at length with patient and her son.     DVT Prophylaxis:  Coumadin    CODE STATUS:   Code  Status and Medical Interventions:   Ordered at: 07/09/18 1443     Level Of Support Discussed With:    Patient     Code Status:    CPR     Medical Interventions (Level of Support Prior to Arrest):    Full       Disposition: I expect the patient to be discharged home in ~1-2 days    Electronically signed by Sejal Salazar MD, 07/10/18, 1:09 PM.        Electronically signed by Sejal DE LA ROSA MD at 7/10/2018  1:09 PM       Oxygen Saturation  07/12/18 0137  --  --  --  --  room air   85   07/12/18 0136  --  --  --  --  room air   83   07/12/18 0135  --  --  --  --  room air   86   07/12/18 0134  --  --  --  --  room air  --   07/12/18 0133  --  --  --  --  room air  --   07/12/18 0132  --  --  --  --  room air   86   07/12/18 0131  --  --  --  --  room air   85   07/12/18 0130  --  --  --  --  room air   84   07/12/18 0129  --  --  --  --  room air   86   07/12/18 0127  --  --  --  --  room air   87

## 2018-07-12 NOTE — PROGRESS NOTES
Case Management Discharge Note    Final Note: Plans to discharge home today. Mrs. Stewart will be returning to her home in [facility]. Orders for home health obtained for skilled nsg, PT and OT. Patient has selected [facility] from the list of providers. CM called referral to [facility]ster at [facility] at [phone] and faxed orders to her at [phone]. Orders also received for oxygen, CM called referral to [facility] in [place] at [phone] and faxed orders to them at [phone]. They will deliver a portable tank to bedside prior to discharge. No other needs noted at this time.     Destination     No service has been selected for the patient.      Durable Medical Equipment     Service Request Status Selected Specialties Address Phone Number Fax Number    [facility] Selected DME Services [address] [place] [id number] [phone]      Dialysis/Infusion     No service has been selected for the patient.      Home Medical Care - Selection Complete     Service Request Status Selected Specialties Address Phone Number Fax Number    [facility] Selected Home Health Services [address] [place] [address] [phone]      Social Care     No service has been selected for the patient.             Final Discharge Disposition Code: 06 - home with home health care

## 2018-07-12 NOTE — DISCHARGE PLACEMENT REQUEST
"Home health referral  Discharging home today, 7/12    Thank You,  Karen Gay, RN    [phone]    Pedro Stewart  (78 y.o. Female)     Date of Birth Social Security Number Address Home Phone MRN    1940  [address] [facility] [address] [phone] [id number]    Roman Catholic Marital Status          Jewish        Admission Date Admission Type Admitting Provider Attending Provider Department, Room/Bed    7/9/18 Emergency Loera, MD Martin Power Mayuri V, MD [address], [address]/1    Discharge Date Discharge Disposition Discharge Destination                       Attending Provider:  Sejal DE LA ROSA MD    Allergies:  No Known Allergies    Isolation:  None   Infection:  None   Code Status:  CPR    Ht:  170 cm (66.93\")   Wt:  93.9 kg (207 lb)    Admission Cmt:  None   Principal Problem:  Acute diastolic congestive heart failure (CMS/HCC) [I50.9]                 Active Insurance as of 7/9/2018     Primary Coverage     Payor Plan Insurance Group Employer/Plan Group    HUMANA MEDICARE REPLACEMENT HUMANA MEDICARE REPL [id number]     Payor Plan Address Payor Plan Phone Number Effective From Effective To    [address] [address] [phone] 1/1/2018     [facility] [address]       Subscriber Name Subscriber Birth Date Member ID       PEDRO STEWART 1940 [id number]                 Emergency Contacts      (Rel.) Home Phone Work Phone Mobile Phone    Wally Stewart (Power of ) [phone] -- --    TyDarlene talbot (Relative) [phone] -- --            Insurance Information                HUMANA MEDICARE REPLACEMENT/HUMANA MEDICARE REPL Phone: [phone]    Subscriber: Pedro Stewart Subscriber#: [id number]    Group#: [id number] Precert#:           [address] [address]  Phone:  [phone]  Fax:   Date: Jul 12, 2018      Ambulatory Referral to Home Health     Patient:  Pedro Stewart MRN:  [id number] "   [address] [facility] [address] :  1940  SSN:    Phone: [phone] Sex:  F      INSURANCE PAYOR PLAN GROUP # SUBSCRIBER ID   Primary:    HUMANA MEDICARE REPLACEMENT [id number]      Referring Provider Information:  ARIEL MARTINI Phone: [phone] Fax:       Referral Information:   # Visits:  1 Referral Type: Home Health [42]   Urgency:  Routine Referral Reason: Specialty Services Required   Start Date: 2018 End Date:  To be determined by Insurer   Diagnosis: Acute congestive heart failure, unspecified congestive heart failure type (CMS/HCC) (I50.9 [ICD-10-CM] 428.0 [ICD-9-CM])  Chronic atrial fibrillation (CMS/HCC) (I48.2 [ICD-10-CM] 427.31 [ICD-9-CM])  Deterioration in ability to walk (R26.2 [ICD-10-CM] 781.2 [ICD-9-CM])  Impaired mobility and ADLs (Z74.09 [ICD-10-CM] 799.89 [ICD-9-CM])  Impaired functional mobility, balance, gait, and endurance (Z74.09 [ICD-10-CM] V49.89 [ICD-9-CM])      Refer to Dept:   Refer to Provider:   Refer to Facility:  [facility]     Face to Face Visit Date: 2018  Follow-up Provider for Plan of Care? I treated the patient in an acute care facility and will not continue treatment after discharge.  Follow-up Provider: SHARATH SALINAS [[id number]]  Reason/Clinical Findings: extended hospitalization, Acute Diastolic Heart Failure, Chronic A-fib, Chronic Coumadin Therapy  Describe mobility limitations that make leaving home difficult: extended hospitalization, Acute Diastolic Heart Failure, Chronic A-fib, Chronic Coumadin Therapy, weakness, shortness of breath, oxygen therapy initiated for home use.  Nursing/Therapeutic Services Requested: Skilled Nursing  Nursing/Therapeutic Services Requested: Physical Therapy  Nursing/Therapeutic Services Requested: Occupational Therapy  Skilled nursing orders: CHF management, Please draw INR on first visit, results to Cardiologist [facility]  Skilled  "nursing orders: O2 instruction  Skilled nursing orders: Cardiopulmonary assessments  PT orders: Strengthening  Occupational orders: Strengthening  Occupational orders: Activities of daily living  Frequency: Other (2-3xweek)     This document serves as a request of services and does not constitute Insurance authorization or approval of services.  To determine eligibility, please contact the members Insurance carrier to verify and review coverage.     If you have medical questions regarding this request for services. Please contact [address] at [phone] between the hours of 8:00am - 5:00pm (Mon-Fri).             Verbal Order Mode: Telephone with readback   Authorizing Provider: Sejal DE LA ROSA MD  Authorizing Provider's NPI: [id number]     Order Entered By: Lyla Gay RN 2018 10:17 AM     Electronically signed by:   Sejal DE LA ROSA MD 2018 10:20 AM                  History & Physical      Samantha Loera MD at 2018  2:45 PM              [facility] Medicine Services  HISTORY AND PHYSICAL    Patient Name: Zee Stewart  : 1940  MRN: [id number]  Primary Care Physician: Raymond Becker MD    Subjective   Subjective     Chief Complaint:  SOA, back and left leg pain    HPI:  Zee Stewart is a 78 y.o. female with PMH significant for chronic atrial fib (on warfarin and followed by Dr. Rodriguez), HTN, gout and recent celluitis BLE, now improved (keflex prescribed ). She comes today because she has been more SOA than usual but has had a marked increase in her SOA in the last 24 hours associated with wheezing. She does not smoke and has not ever been diagnosed with COPD or asthma.  She also reports increased BLE edema, abdominal \"tightness\" and orthopnea.  She is particularly SOA with minimal exertion.  She believes her last ECHO was 2-3 years ago with Dr. Rodriguez and does not remember any mention of heart failure.  She has not had chest pain, " "cough, fever, or syncope.      In addition, the patient fell about 4 days ago (she was getting out of bed and attempted to hold onto the chest of drawers but missed).  She says this fall has \"thrown her back out.\"  She is having pain gerson in her back, through her left hip and into her left leg.  She has been wheeling around the house in a wheelchair due to her inability to walk (due to pain, not weakness).  She does not have saddle anesthesia and has not lost control of her bowels/bladder.  She was in fact able to put her own pants on today so while she is still having pain and debility, she is a little better.  She has taken some tramadol for this pain.    Here in the ER, , WBC 11.64K, H/H 9.8/32.0, K 3.3, glc 159, creat 1.15, TSH 2.0, uric acid 8.4, ESR 39, .  CTA - no PE, some heart enlargement with small bilateral pleural effusions.    L spine MRI: Narrowing of the neural foramina bilaterally at the L3/L4  and L4/L5 level. Narrowing as well seen at the left neural foramina at  the L2/L3 level. No definite nerve root compromise identified. Contact  cannot be excluded on the left at the L2/L3 level.    Review of Systems   Constitutional: Positive for activity change and fatigue. Negative for chills and fever.   HENT: Negative.    Eyes: Negative.    Respiratory: Positive for shortness of breath and wheezing.    Cardiovascular: Positive for leg swelling. Negative for chest pain and palpitations.   Gastrointestinal: Positive for abdominal distention. Negative for diarrhea, nausea and vomiting.   Endocrine: Negative.    Genitourinary: Negative.    Musculoskeletal:        Back pain that radiated to left leg   Skin: Negative.    Allergic/Immunologic: Negative.    Neurological: Negative.    Hematological: Negative.    Psychiatric/Behavioral: Negative.           Otherwise 10-system ROS reviewed and is negative except as mentioned in the HPI.    Personal History     Past Medical History:   Diagnosis Date   • " Arthritis    • Atrial fibrillation (CMS/HCC)    • Cellulitis    • CHF (congestive heart failure) (CMS/HCC)    • Coronary artery disease    • Gout    • Hypertension        Past Surgical History:   Procedure Laterality Date   • ABDOMINAL SURGERY     • CYSTOSCOPY W/ LASER LITHOTRIPSY     • TUBAL ABDOMINAL LIGATION     • URETEROSCOPY STENT INSERTION         Family History: family history includes Heart disease in her father.     Social History:  reports that she has quit smoking. She does not have any smokeless tobacco history on file. She reports that she does not drink alcohol or use drugs.  Social History     Social History Narrative    Lives alone       Medications:  Prescriptions Prior to Admission   Medication Sig Dispense Refill Last Dose   • allopurinol (ZYLOPRIM) 100 MG tablet Take 100 mg by mouth 2 (Two) Times a Day.   7/9/2018 at Unknown time   • amLODIPine (NORVASC) 5 MG tablet Take 5 mg by mouth Daily.   7/9/2018 at Unknown time   • furosemide (LASIX) 20 MG tablet Take 40 mg by mouth Daily.   7/8/2018 at Unknown time   • metoprolol succinate XL (TOPROL-XL) 200 MG 24 hr tablet Take 200 mg by mouth Daily.   7/9/2018 at Unknown time   • traMADol-acetaminophen (ULTRACET) 37.5-325 MG per tablet Take 1 tablet by mouth Daily As Needed for Moderate Pain .   7/8/2018 at Unknown time   • warfarin (COUMADIN) 1 MG tablet Take 1 mg by mouth Daily.   7/9/2018 at Unknown time       No Known Allergies    Objective   Objective     Vital Signs:   Temp:  [98.1 °F (36.7 °C)-98.4 °F (36.9 °C)] 98.1 °F (36.7 °C)  Heart Rate:  [] 79  Resp:  [20-22] 22  BP: (111-186)/(50-84) 154/82        Physical Exam   Constitutional: Mild dyspnea, awake, alert  Eyes: PERRLA, sclerae anicteric, no conjunctival injection  HENT: NCAT, mucous membranes moist  Neck: Supple, no thyromegaly, no lymphadenopathy, trachea midline  Respiratory: Fine rales bases nonlabored respirations   Cardiovascular: irreg/irreg, no murmurs, rubs, or gallops,  palpable pedal pulses bilaterally  Gastrointestinal: Positive bowel sounds, mild distension, obese  Musculoskeletal: 1-2+ bilateral ankle edema, no clubbing or cyanosis to extremities  Psychiatric: Appropriate affect, cooperative  Neurologic: Oriented x 3, strength symmetric in all extremities, Cranial Nerves grossly intact to confrontation, speech clear  Skin: No rashes      Results Reviewed:  I have personally reviewed current lab, radiology, and data and agree.      Results from last 7 days  Lab Units 07/09/18 0905 07/09/18  0904   WBC 10*3/mm3  --  11.64*   HEMOGLOBIN g/dL  --  9.8*   HEMATOCRIT %  --  32.0*   PLATELETS 10*3/mm3  --  243   INR  1.95*  --        Results from last 7 days  Lab Units 07/09/18  0905   SODIUM mmol/L 144   POTASSIUM mmol/L 3.3*   CHLORIDE mmol/L 108   CO2 mmol/L 26.0   BUN mg/dL 22   CREATININE mg/dL 1.15   GLUCOSE mg/dL 159*   CALCIUM mg/dL 8.3*   ALT (SGPT) U/L 9   AST (SGOT) U/L 19     Estimated Creatinine Clearance: 48.3 mL/min (by C-G formula based on SCr of 1.15 mg/dL).  Brief Urine Lab Results     None        BNP   Date Value Ref Range Status   07/09/2018 420.0 (H) 0.0 - 100.0 pg/mL Final     Comment:     Results may be falsely decreased if patient taking Biotin.     Imaging Results (last 24 hours)     Procedure Component Value Units Date/Time    CT Angiogram Chest With Contrast [[id number]] Collected:  07/09/18 1412     Updated:  07/09/18 1452    Narrative:       EXAMINATION: CT ANGIOGRAM CHEST W CONTRAST- 07/09/2018     INDICATION: I50.9-Heart failure, unspecified; R09.02-Hypoxemia;  M60.869-Other myositis, unspecified lower leg; E79.0-Hyperuricemia  without signs of inflammatory arthritis and tophaceous disease;  R79.89-Other specified abnormal findings of blood chemistry; Z79.01-Long  term (current) use of anticoagulants; I48.2-Chronic atrial fibrillation;  M51.36-Other intervertebral disc degeneration, lumbar; shortness of  breath, leg swelling     TECHNIQUE: Multiple axial  CT imaging was obtained of the chest following  the administration of intravenous contrast. Coronal 2-D reformatted  images were submitted to further facilitate diagnostic accuracy and  treatment planning.     The radiation dose reduction device was turned on for each scan per the  ALARA (As Low as Reasonably Achievable) protocol.     COMPARISON: NONE     FINDINGS: There are small bilateral pleural effusions left greater than  right. No filling defect seen of the pulmonary arteries to suggest  evidence of pulmonary embolism. The cardiac chambers are enlarged. There  is no pericardial effusion. No bulky hilar or axillary lymphadenopathy.  Vascular calcifications seen within the thoracic aorta. No pneumothorax.  No parenchymal consolidation, pulmonary mass or other nodule identified.  Degenerative changes seen within the spine. The visualized portions of  the upper abdomen are unremarkable. Degenerative changes seen within the  spine.       Impression:       Enlargement of the heart with small bilateral pleural  effusions left greater than right. No evidence of filling defect to  suggest pulmonary embolism.  No acute parenchymal disease.     D:  07/09/2018  E:  07/09/2018        This report was finalized on 7/9/2018 2:50 PM by Dr. Dulce Maria Us MD.       MRI Lumbar Spine Without Contrast [[id number]] Collected:  07/09/18 1123     Updated:  07/09/18 1303    Narrative:       EXAMINATION: MRI LUMBAR SPINE WO CONTRAST-07/09/2018:     INDICATION: Bilateral leg weakness L>R, loss of ability to walk, low  back pain radiating into the left leg.     TECHNIQUE: Routine multiplanar imaging was obtained of the lumbar spine  without the administration of Gadolinium contrast.     COMPARISON: NONE.     FINDINGS: The vertebral body height is preserved with normal signal  intensity within the vertebral bodies. Anterior spurring and bridging  osteophyte formation seen from L1 through L5. There are degenerative  changes seen within  the endplates inferiorly at the L3 and L4 levels.  Some disc desiccation seen at the L3/L4, L4/L5 and L5/S1 levels. There  is slight curvature of the spine with convexity to the right. Normal  signal intensity within the conus. The spinal cord terminates at the L1  level.     Axial imaging reveals evidence of a broad-based disc bulge at the L2/L3  level with some lateralization to the left creating narrowing of the  left neural foramina. Degenerative changes seen within the posterior  facets. Nerve root contact and compromise on the left cannot be  excluded. No significant central spinal canal stenosis.     At the L3/L4 level there is a broad-based disc bulge creating some  narrowing of the neural foramina bilaterally and moderate central spinal  canal stenosis. Degenerative changes seen within the posterior facets.  No significant nerve root compromise identified.     At the L4/L5 level there is a broad-based disc bulge in combination with  posterior facet hypertrophy creating narrowing of the neural foramina  bilaterally and moderate central spinal canal stenosis. No significant  nerve root compromise.     At the L5/S1 level there is a broad-based disc bulge with no significant  central spinal canal stenosis or nerve root compromise. No  neuroforaminal narrowing identified.       Impression:       Narrowing of the neural foramina bilaterally at the L3/L4  and L4/L5 level. Narrowing as well seen at the left neural foramina at  the L2/L3 level. No definite nerve root compromise identified. Contact  cannot be excluded on the left at the L2/L3 level.     D:  07/09/2018  E:  07/09/2018            This report was finalized on 7/9/2018 1:01 PM by Dr. Dulce Maria Us MD.       XR Chest 1 View [[id number]] Collected:  07/09/18 1004     Updated:  07/09/18 1029    Narrative:       EXAMINATION: XR CHEST 1 VW-      INDICATION: Shortness of air triage protocol.      COMPARISON: None.     FINDINGS: Portable chest reveals  heart to be enlarged. Underlying  chronic and emphysematous change is seen within the lung fields  bilaterally. Degenerative change is seen within the spine. Pulmonary  vascularity is within normal limits.  No pleural effusion or  pneumothorax. Pulmonary vascularity is prominent.           Impression:       Heart is enlarged with slight prominence of the pulmonary  vascularity with no acute parenchymal disease.     D:  07/09/2018  E:  07/09/2018     This report was finalized on 7/9/2018 10:27 AM by Dr. Dulce Maria Us MD.                Assessment/Plan   Assessment / Plan     Hospital Problem List     * (Principal)Acute congestive heart failure (CMS/HCC)    Chronic atrial fibrillation (CMS/HCC)    Hypokalemia    Gout (Chronic)    Hypertension (Chronic)    Elevated CK    Anemia    Leukocytosis    Warfarin anticoagulation (Chronic)    Hyperglycemia        Assessment & Plan:  Ms. Stewart is a 78 year old woman with PMH significant for HTN, chronic atrial fib (on warfarin and followed by Dr. Rodriguez), and gout.  She presents to [facility] ER with acute heart failure and back pain    ECHO  20 mg lasix IV given in ER, will initiate 20 mg IV lasix BID for now and  hospitalist to check clinical response in AM  Continue bblocker  D/c norvasc and start lisinopril  TSH wnl  Will defer cardio consult for now, would suggest differentiating severity with  echo, diurese and follow up with Dr. Rodriguez    Pharmacy to dose warfarin  Atrial fib is rate controlled    K+ was replaced in ER, recheck in AM    Elevated CK - unsure etilogy - perhaps from her fall 4 days ago?  Or from  decreased activity?  Not on a statin and no muscle tenderness.    Anemia -- AM studies and guiac    Leukocytosis - Check UA    Hyperglycemia:  Check A1C    Acute back pain -- continue tramadol, add lidoderm patch, heat, PT/OT.  Consider F/U with neurosurgeon after d/c but has not had a trial of conservative management.      DVT prophylaxis:LMWH    CODE STATUS:     Code Status and Medical Interventions:   Ordered at: 18 1443     Level Of Support Discussed With:    Patient     Code Status:    CPR     Medical Interventions (Level of Support Prior to Arrest):    Full       INPATIENT status due to the need for care which can only be reasonably provided in an hospital setting such as aggressive/expedited ancillary services, the necessity for IV medications, close physician monitoring.  In such, I feel patient’s risk for adverse outcomes and need for care warrant INPATIENT evaluation and predict the patient’s care encounter to likely last beyond 2 midnights.    Electronically signed by Samantha Loera MD, 18, 2:45 PM.          Electronically signed by Samantha Loera MD at 2018  9:42 PM          Physician Progress Notes (last 72 hours) (Notes from 2018 10:17 AM through 2018 10:17 AM)      Sejal DE LA ROSA MD at 2018  9:23 AM              [facility] Medicine Services  PROGRESS NOTE    Patient Name: Zee Stewart  : 1940  MRN: [id number]    Date of Admission: 2018  Length of Stay: 1  Primary Care Physician: Raymond Becker MD    Subjective   Subjective     CC:  F/U SOA, back pain    HPI:  Patient seen this morning. Off oxygen, maintaining O2 sats in upper 90s. Her breathing is much better, not as short of breath when ambulating to bathroom and back. She noticed she gained 2 lbs since yesterday however, is concerned. Ambulating well with walker and PT.     Review of Systems  Gen-no fevers, no chills  CV-no chest pain, no palpitations  Resp-no cough, improved dyspnea  GI-no N/V/D, no abd pain    Otherwise ROS is negative except as mentioned in the HPI.    Objective   Objective     Vital Signs:   Temp:  [97.5 °F (36.4 °C)-97.8 °F (36.6 °C)] 97.8 °F (36.6 °C)  Heart Rate:  [74-95] 74  Resp:  [16-18] 16  BP: (134-138)/(65-82) 138/65        Physical Exam:  Gen-no acute distress  CV-irregular, S1 S2 normal, no  m/r/g  Resp-decreased at the bases bilaterally, normal effort  Abd-soft, NT, ND, +BS  Ext-1+ BLE edema, improved  Neuro-A&Ox3, no focal deficits  Psych-appropriate mood      Results Reviewed:  I have personally reviewed current lab, radiology, and data and agree.      Results from last 7 days  Lab Units 07/11/18  0330 07/10/18  0431 07/10/18  0430 07/09/18  1458  07/09/18  0904   WBC 10*3/mm3 11.89*  --  8.10  --   --  11.64*   HEMOGLOBIN g/dL 8.8*  --  9.3*  --   --  9.8*   HEMATOCRIT % 28.4*  --  30.9*  --   --  32.0*   PLATELETS 10*3/mm3 245  --  230  --   --  243   INR  2.36* 1.98*  --  1.96*  < >  --    < > = values in this interval not displayed.    Results from last 7 days  Lab Units 07/11/18  0330 07/10/18  0430 07/09/18  0905   SODIUM mmol/L 141 144 144   POTASSIUM mmol/L 4.0 4.3 3.3*   CHLORIDE mmol/L 107 113* 108   CO2 mmol/L 25.0 28.0 26.0   BUN mg/dL 36* 23 22   CREATININE mg/dL 1.21 1.13 1.15   GLUCOSE mg/dL 108* 139* 159*   CALCIUM mg/dL 7.7* 8.3* 8.3*   ALT (SGPT) U/L  --  8 9   AST (SGOT) U/L  --  18 19     Estimated Creatinine Clearance: 45.6 mL/min (by C-G formula based on SCr of 1.21 mg/dL).  BNP   Date Value Ref Range Status   07/11/2018 256.0 (H) 0.0 - 100.0 pg/mL Final     Comment:     Results may be falsely decreased if patient taking Biotin.       Microbiology Results Abnormal     None          Imaging Results (last 24 hours)     ** No results found for the last 24 hours. **        Results for orders placed during the hospital encounter of 07/09/18   Adult Transthoracic Echo Complete W/ Cont if Necessary Per Protocol    Narrative · Moderate pulmonic valve regurgitation is present.  · Left atrial cavity size is mild-to-moderately dilated.  · Mild mitral valve regurgitation is present.  · Moderate to severe tricuspid valve regurgitation is present.  · Calculated right ventricular systolic pressure from tricuspid   regurgitation is 66 mmHg.  · Right ventricular cavity is mild-to-moderately  dilated.  · Moderate aortic valve regurgitation is present.  · Left ventricular systolic function is normal. Estimated EF = 70%.  · There is no evidence of pericardial effusion.  · Moderate to severe pulmonary hypertension is present.  · Mild MAC is present.  · The aortic valve exhibits sclerosis.          I have reviewed the medications.    Assessment/Plan   Assessment / Plan     Hospital Problem List     * (Principal)Acute diastolic congestive heart failure (CMS/HCC)    Chronic atrial fibrillation (CMS/HCC)    Hypokalemia    Gout (Chronic)    Hypertension (Chronic)    Elevated CK    Anemia    Leukocytosis    Warfarin anticoagulation (Chronic)    Hyperglycemia    Pulmonary hypertension    Moderate to severe tricuspid regurgitation    Back pain due to recent fall             Brief Hospital Course to date:  Zee Stewart is a 78 y.o. female with hx of HTN, chronic Afib on Coumadin (followed by Dr. Rodriguez), and gout presents due to increased SOA and BLE edema. She also had a recent fall and has had back pain and difficulty walking. In the ER, labs showed  and CTA chest showed cardiac enlargement and bilateral pleural effusions, no PE. Admitted to hospitalists for heart failure management.       Assessment & Plan:  --Continue diuresis with 20 mg IV Lasix BID. Strict I&O and daily weights. Can probably switch to oral Lasix tomorrow.   --Echo reviewed. Shows normal EF but moderate to severe TR and pulmonary HTN. I wonder if she has untreated KATIA (nursing staff has noted nocturnal apnea and desaturation). Recommend outpatient sleep study after discharge as well as close follow up with Dr. Rodriguez.   --MRI L spine shows some narrowing but no severe stenosis or acute fractures. Continue PT/OT and supportive care. Pain improved, did not want Lidoderm patch this morning.   --Plan home tomorrow with HHPT if continues to improve.     DVT Prophylaxis:  Coumadin    CODE STATUS:   Code Status and Medical Interventions:    Ordered at: 18 1443     Level Of Support Discussed With:    Patient     Code Status:    CPR     Medical Interventions (Level of Support Prior to Arrest):    Full       Disposition: I expect the patient to be discharged home in ~1 day    Electronically signed by Sejal Salazar MD, 18, 9:25 AM.        Electronically signed by Sejal DE LA ROSA MD at 2018  9:25 AM     Sejal DE LA ROSA MD at 7/10/2018 12:59 PM              [facility] Medicine Services  PROGRESS NOTE    Patient Name: Zee Stewart  : 1940  MRN: [id number]    Date of Admission: 2018  Length of Stay: 1  Primary Care Physician: Raymond Becker MD    Subjective   Subjective     CC:  F/U SOA, back pain    HPI:  Patient seen this morning. Son just arrived. She states her breathing is better, as well as her back and leg pain. She was able to walk with a walker this morning with therapy.     Review of Systems  Gen-no fevers, no chills  CV-no chest pain, no palpitations  Resp-no cough, improved dyspnea  GI-no N/V/D, no abd pain    Otherwise ROS is negative except as mentioned in the HPI.    Objective   Objective     Vital Signs:   Temp:  [98 °F (36.7 °C)-98.4 °F (36.9 °C)] 98 °F (36.7 °C)  Heart Rate:  [72-91] 91  Resp:  [18-22] 18  BP: (115-160)/(45-82) 126/45        Physical Exam:  Gen-no acute distress  CV-irregular, S1 S2 normal, no m/r/g  Resp-decreased at the bases bilaterally, mild expiratory wheezes  Abd-soft, NT, ND, +BS  Ext-1-2+ BLE edema  Neuro-A&Ox3, no focal deficits  Psych-appropriate mood      Results Reviewed:  I have personally reviewed current lab, radiology, and data and agree.      Results from last 7 days  Lab Units 07/10/18  0431 07/10/18  0430 18  1458 18  0905 18  0904   WBC 10*3/mm3  --  8.10  --   --  11.64*   HEMOGLOBIN g/dL  --  9.3*  --   --  9.8*   HEMATOCRIT %  --  30.9*  --   --  32.0*   PLATELETS 10*3/mm3  --  230  --   --  243   INR  1.98*  --  1.96* 1.95*   --        Results from last 7 days  Lab Units 07/10/18  0430 07/09/18  0905   SODIUM mmol/L 144 144   POTASSIUM mmol/L 4.3 3.3*   CHLORIDE mmol/L 113* 108   CO2 mmol/L 28.0 26.0   BUN mg/dL 23 22   CREATININE mg/dL 1.13 1.15   GLUCOSE mg/dL 139* 159*   CALCIUM mg/dL 8.3* 8.3*   ALT (SGPT) U/L 8 9   AST (SGOT) U/L 18 19     Estimated Creatinine Clearance: 48.6 mL/min (by C-G formula based on SCr of 1.13 mg/dL).  BNP   Date Value Ref Range Status   07/09/2018 420.0 (H) 0.0 - 100.0 pg/mL Final     Comment:     Results may be falsely decreased if patient taking Biotin.       Microbiology Results Abnormal     None          Imaging Results (last 24 hours)     Procedure Component Value Units Date/Time    CT Angiogram Chest With Contrast [[id number]] Collected:  07/09/18 1412     Updated:  07/09/18 1452    Narrative:       EXAMINATION: CT ANGIOGRAM CHEST W CONTRAST- 07/09/2018     INDICATION: I50.9-Heart failure, unspecified; R09.02-Hypoxemia;  M60.869-Other myositis, unspecified lower leg; E79.0-Hyperuricemia  without signs of inflammatory arthritis and tophaceous disease;  R79.89-Other specified abnormal findings of blood chemistry; Z79.01-Long  term (current) use of anticoagulants; I48.2-Chronic atrial fibrillation;  M51.36-Other intervertebral disc degeneration, lumbar; shortness of  breath, leg swelling     TECHNIQUE: Multiple axial CT imaging was obtained of the chest following  the administration of intravenous contrast. Coronal 2-D reformatted  images were submitted to further facilitate diagnostic accuracy and  treatment planning.     The radiation dose reduction device was turned on for each scan per the  ALARA (As Low as Reasonably Achievable) protocol.     COMPARISON: NONE     FINDINGS: There are small bilateral pleural effusions left greater than  right. No filling defect seen of the pulmonary arteries to suggest  evidence of pulmonary embolism. The cardiac chambers are enlarged. There  is no pericardial  effusion. No bulky hilar or axillary lymphadenopathy.  Vascular calcifications seen within the thoracic aorta. No pneumothorax.  No parenchymal consolidation, pulmonary mass or other nodule identified.  Degenerative changes seen within the spine. The visualized portions of  the upper abdomen are unremarkable. Degenerative changes seen within the  spine.       Impression:       Enlargement of the heart with small bilateral pleural  effusions left greater than right. No evidence of filling defect to  suggest pulmonary embolism.  No acute parenchymal disease.     D:  07/09/2018  E:  07/09/2018        This report was finalized on 7/9/2018 2:50 PM by Dr. Dulce Maria Us MD.       MRI Lumbar Spine Without Contrast [[id number]] Collected:  07/09/18 1123     Updated:  07/09/18 1303    Narrative:       EXAMINATION: MRI LUMBAR SPINE WO CONTRAST-07/09/2018:     INDICATION: Bilateral leg weakness L>R, loss of ability to walk, low  back pain radiating into the left leg.     TECHNIQUE: Routine multiplanar imaging was obtained of the lumbar spine  without the administration of Gadolinium contrast.     COMPARISON: NONE.     FINDINGS: The vertebral body height is preserved with normal signal  intensity within the vertebral bodies. Anterior spurring and bridging  osteophyte formation seen from L1 through L5. There are degenerative  changes seen within the endplates inferiorly at the L3 and L4 levels.  Some disc desiccation seen at the L3/L4, L4/L5 and L5/S1 levels. There  is slight curvature of the spine with convexity to the right. Normal  signal intensity within the conus. The spinal cord terminates at the L1  level.     Axial imaging reveals evidence of a broad-based disc bulge at the L2/L3  level with some lateralization to the left creating narrowing of the  left neural foramina. Degenerative changes seen within the posterior  facets. Nerve root contact and compromise on the left cannot be  excluded. No significant central  spinal canal stenosis.     At the L3/L4 level there is a broad-based disc bulge creating some  narrowing of the neural foramina bilaterally and moderate central spinal  canal stenosis. Degenerative changes seen within the posterior facets.  No significant nerve root compromise identified.     At the L4/L5 level there is a broad-based disc bulge in combination with  posterior facet hypertrophy creating narrowing of the neural foramina  bilaterally and moderate central spinal canal stenosis. No significant  nerve root compromise.     At the L5/S1 level there is a broad-based disc bulge with no significant  central spinal canal stenosis or nerve root compromise. No  neuroforaminal narrowing identified.       Impression:       Narrowing of the neural foramina bilaterally at the L3/L4  and L4/L5 level. Narrowing as well seen at the left neural foramina at  the L2/L3 level. No definite nerve root compromise identified. Contact  cannot be excluded on the left at the L2/L3 level.     D:  07/09/2018  E:  07/09/2018            This report was finalized on 7/9/2018 1:01 PM by Dr. Dulce Maria Us MD.           Results for orders placed during the hospital encounter of 07/09/18   Adult Transthoracic Echo Complete W/ Cont if Necessary Per Protocol    Narrative · Moderate pulmonic valve regurgitation is present.  · Left atrial cavity size is mild-to-moderately dilated.  · Mild mitral valve regurgitation is present.  · Moderate to severe tricuspid valve regurgitation is present.  · Calculated right ventricular systolic pressure from tricuspid   regurgitation is 66 mmHg.  · Right ventricular cavity is mild-to-moderately dilated.  · Moderate aortic valve regurgitation is present.  · Left ventricular systolic function is normal. Estimated EF = 70%.  · There is no evidence of pericardial effusion.  · Moderate to severe pulmonary hypertension is present.  · Mild MAC is present.  · The aortic valve exhibits sclerosis.          I have  reviewed the medications.    Assessment/Plan   Assessment / Plan     Hospital Problem List     * (Principal)Acute diastolic congestive heart failure (CMS/HCC)    Chronic atrial fibrillation (CMS/HCC)    Hypokalemia    Gout (Chronic)    Hypertension (Chronic)    Elevated CK    Anemia    Leukocytosis    Warfarin anticoagulation (Chronic)    Hyperglycemia    Pulmonary hypertension    Moderate to severe tricuspid regurgitation    Back pain due to recent fall             Brief Hospital Course to date:  Zee Stewart is a 78 y.o. female with hx of HTN, chronic Afib on Coumadin (followed by Dr. Rodriguez), and gout presents due to increased SOA and BLE edema. She also had a recent fall and has had back pain and difficulty walking. In the ER, labs showed  and CTA chest showed cardiac enlargement and bilateral pleural effusions, no PE. Admitted to hospitalists for heart failure management.       Assessment & Plan:  --Continue diuresis with 20 mg IV Lasix BID. Strict I&O and daily weights.   --Echo reviewed. Shows normal EF but moderate to severe TR and pulmonary HTN. I wonder also if she has untreated KATIA (nursing staff has noted nocturnal apnea and desaturation). Recommend outpatient sleep study after discharge as well as close follow up with Dr. Rodriguez.   --MRI L spine shows some narrowing but no severe stenosis or acute fractures. Continue PT/OT and supportive care.   --Discussed at length with patient and her son.     DVT Prophylaxis:  Coumadin    CODE STATUS:   Code Status and Medical Interventions:   Ordered at: 07/09/18 1443     Level Of Support Discussed With:    Patient     Code Status:    CPR     Medical Interventions (Level of Support Prior to Arrest):    Full       Disposition: I expect the patient to be discharged home in ~1-2 days    Electronically signed by Sejal Salazar MD, 07/10/18, 1:09 PM.        Electronically signed by Sejal DE LA ROSA MD at 7/10/2018  1:09 PM          Physical Therapy Notes (last  72 hours) (Notes from 2018 10:17 AM through 2018 10:17 AM)      Hilda Naidu PT at 7/10/2018  5:12 PM  Version 1 of 1         Problem: Patient Care Overview  Goal: Plan of Care Review  Outcome: Ongoing (interventions implemented as appropriate)   07/10/18 1709   Coping/Psychosocial   Plan of Care Reviewed With patient   OTHER   Outcome Summary Patient presents with L lower extremity weakness in L123 pattern. Some buckling during ambulation requiring walker for safety. Will need continued skilled PT at d/c.           Electronically signed by Hilda Naidu PT at 7/10/2018  5:12 PM     Hilda Naidu PT at 7/10/2018  5:14 PM  Version 1 of 1         Acute Care - Physical Therapy Initial Evaluation  [facility]     Patient Name: Zee Stewart  : 1940  MRN: [id number]  Today's Date: 7/10/2018   Onset of Illness/Injury or Date of Surgery: 18  Date of Referral to PT: 18  Referring Physician: Dr Loera      Admit Date: 2018    Visit Dx:     ICD-10-CM ICD-9-CM   1. Acute congestive heart failure, unspecified congestive heart failure type (CMS/HCC) I50.9 428.0   2. Hypoxia R09.02 799.02   3. Myositis of lower extremity, unspecified laterality, unspecified myositis type M60.869 729.1   4. Abnormal blood level of uric acid E79.0 790.6   5. Positive D dimer R79.89 790.92   6. Chronic anticoagulation Z79.01 V58.61   7. Chronic atrial fibrillation (CMS/HCC) I48.2 427.31   8. DDD (degenerative disc disease), lumbar M51.36 722.52   9. Deterioration in ability to walk R26.2 781.2   10. Hypokalemia E87.6 276.8   11. Impaired mobility and ADLs Z74.09 799.89   12. Impaired functional mobility, balance, gait, and endurance Z74.09 V49.89     Patient Active Problem List   Diagnosis   • Chronic atrial fibrillation (CMS/HCC)   • Hypokalemia   • Gout   • Hypertension   • Elevated CK   • Acute diastolic congestive heart failure (CMS/HCC)   • Anemia   • Leukocytosis   • Warfarin anticoagulation   •  Hyperglycemia   • Pulmonary hypertension   • Moderate to severe tricuspid regurgitation   • Back pain due to recent fall     Past Medical History:   Diagnosis Date   • Arthritis    • Atrial fibrillation (CMS/HCC)    • Cellulitis    • CHF (congestive heart failure) (CMS/HCC)    • Coronary artery disease    • Gout    • Hypertension      Past Surgical History:   Procedure Laterality Date   • ABDOMINAL SURGERY     • CYSTOSCOPY W/ LASER LITHOTRIPSY     • TUBAL ABDOMINAL LIGATION     • URETEROSCOPY STENT INSERTION          PT ASSESSMENT (last 12 hours)      Physical Therapy Evaluation     Row Name 07/10/18 151          PT Evaluation Time/Intention    Subjective Information complains of;dyspnea  -SC     Document Type evaluation  -SC     Mode of Treatment physical therapy  -SC     Patient Effort good  -SC     Symptoms Noted During/After Treatment other (see comments)   WHEEZING  -SC     Row Name 07/10/18 7628          General Information    Patient Profile Reviewed? yes  -SC     Onset of Illness/Injury or Date of Surgery 07/09/18  -SC     Referring Physician Dr Loera  -SC     Patient Observations alert;cooperative;agree to therapy  -SC     General Observations of Patient in chair  -SC     Prior Level of Function independent:;community mobility;gait;all household mobility;transfer;driving  -SC     Equipment Currently Used at Home cane, straight;cane, quad;walker, rolling;commode, bedside;shower chair   does not need walker to ambulate  -SC     Pertinent History of Current Functional Problem Admitted with soa, falls,back and L leg pain. dx with CHF, hypoxia, myositis  -SC     Existing Precautions/Restrictions fall;oxygen therapy device and L/min;other (see comments)   L leg buckling  -SC     Risks Reviewed patient:;increased discomfort  -SC     Benefits Reviewed patient:;improve function;increase independence;increase strength  -SC     Barriers to Rehab none identified  -SC     Row Name 07/10/18 0575           Relationship/Environment    Primary Source of Support/Comfort child(henok)  -SC     Lives With alone  -SC     Row Name 07/10/18 1515          Resource/Environmental Concerns    Current Living Arrangements home/apartment/condo  -SC     Row Name 07/10/18 1515          Home Main Entrance    Number of Stairs, Main Entrance one  -SC     Row Name 07/10/18 1515          Cognitive Assessment/Intervention- PT/OT    Orientation Status (Cognition) oriented x 4  -SC     Follows Commands (Cognition) WNL  -SC     Row Name 07/10/18 1515          Safety Issues, Functional Mobility    Impairments Affecting Function (Mobility) motor control;strength  -SC     Row Name 07/10/18 1515          Bed Mobility Assessment/Treatment    Comment (Bed Mobility) uic  -SC     Row Name 07/10/18 1515          Transfer Assessment/Treatment    Transfer Assessment/Treatment sit-stand transfer;stand-sit transfer  -SC     Comment (Transfers) cues for safety and hand placement  -SC     Sit-Stand [place] (Transfers) verbal cues;supervision  -SC     Stand-Sit [place] (Transfers) verbal cues;supervision  -SC     Row Name 07/10/18 1515          Sit-Stand Transfer    Assistive Device (Sit-Stand Transfers) walker, front-wheeled  -SC     Row Name 07/10/18 1515          Stand-Sit Transfer    Assistive Device (Stand-Sit Transfers) walker, front-wheeled  -SC     Row Name 07/10/18 1515          Gait/Stairs Assessment/Training    Gait/Stairs Assessment/Training gait/ambulation independence  -SC     [place] Level (Gait) contact guard;verbal cues  -SC     Assistive Device (Gait) walker, front-wheeled  -SC     Distance in Feet (Gait) 250   x3 standing rest, wheezinf noted at end of walk  -SC     Pattern (Gait) swing-through  -SC     Left Sided Gait Deviations knee buckling, left side  -SC     Comment (Gait/Stairs) cues to go slowly and stay close to walker  -SC     Row Name 07/10/18 1515          General ROM    GENERAL ROM COMMENTS wnl  -SC     Row Name  07/10/18 1515          General Assessment (Manual Muscle Testing)    General Manual Muscle Testing (MMT) Assessment lower extremity strength deficits identified  -SC     Row Name 07/10/18 1515          Lower Extremity (Manual Muscle Testing)    Lower Extremity: Manual Muscle Testing (MMT) left hip strength deficit;left knee strength deficit  -SC     Row Name 07/10/18 1515          Left Hip (Manual Muscle Testing)    Left Hip Manual Muscle Testing (MMT) flexion  -SC     MMT: Flexion, Left Hip flexion  -SC     MMT, Gross Movement: Left Hip Flexion (4-/5) good minus  -SC     Row Name 07/10/18 1515          Left Knee (Manual Muscle Testing)    Left Knee Manual Muscle Testing (MMT) extension  -SC     MMT: Extension, Left Knee extension  -SC     MMT, Gross Movement: Left Knee Extension (3+/5) fair plus  -SC     Row Name 07/10/18 1515          Motor Assessment/Intervention    Additional Documentation Balance (Group);Therapeutic Exercise (Group);Therapeutic Exercise Interventions (Group)  -SC     Row Name 07/10/18 1515          Therapeutic Exercise    Lower Extremity (Therapeutic Exercise) LAQ (long arc quad), bilateral;marching while seated  -SC     Exercise Type (Therapeutic Exercise) AROM (active range of motion)  -SC     Position (Therapeutic Exercise) seated  -SC     Sets/Reps (Therapeutic Exercise) 5-10  -SC     Row Name 07/10/18 1515          Balance    Balance dynamic standing balance  -SC     Row Name 07/10/18 1515          Dynamic Standing Balance    Level of [place], Reaches Outside Midline (Standing, Dynamic Balance) contact guard assist   walker  -SC     Row Name 07/10/18 1515          Sensory Assessment/Intervention    Sensory General Assessment no sensation deficits identified  -SC     Row Name 07/10/18 1515          Pain Assessment    Additional Documentation Pain Scale: Numbers Pre/Post-Treatment (Group)  -SC     Row Name 07/10/18 1515          Pain Scale: Numbers Pre/Post-Treatment    Pain Scale:  Numbers, Pretreatment 3/10  -SC     Pain Scale: Numbers, Post-Treatment 3/10  -SC     Pain Location - Side Left  -SC     Pain Location back  -SC     Pre/Post Treatment Pain Comment --   radiates to groin  -SC     Row Name 07/10/18 1515          Coping    Observed Emotional State calm;cooperative  -SC     Verbalized Emotional State acceptance  -SC     Row Name 07/10/18 1515          Plan of Care Review    Plan of Care Reviewed With patient  -SC     Row Name 07/10/18 1515          Physical Therapy Clinical Impression    Date of Referral to PT 07/09/18  -SC     PT Diagnosis (PT Clinical Impression) impaired mobility  -SC     Criteria for Skilled Interventions Met (PT Clinical Impression) yes;treatment indicated  -SC     Pathology/Pathophysiology Noted (Describe Specifically for Each System) musculoskeletal;neuromuscular  -SC     Impairments Found (describe specific impairments) gait, locomotion, and balance;motor function;muscle performance  -SC     Rehab Potential (PT Clinical Summary) good, to achieve stated therapy goals  -SC     Care Plan Review (PT) care plan/treatment goals reviewed;risks/benefits reviewed;evaluation/treatment results reviewed  -SC     Row Name 07/10/18 1515          Vital Signs    Pre SpO2 (%) 95  -SC     O2 Delivery Pre Treatment supplemental O2  -SC     Intra SpO2 (%) 95  -SC     O2 Delivery Intra Treatment supplemental O2  -SC     Post SpO2 (%) 95  -SC     O2 Delivery Post Treatment supplemental O2  -SC     Row Name 07/10/18 1516          Physical Therapy Goals    Bed Mobility Goal Selection (PT) bed mobility, PT goal 1  -SC     Transfer Goal Selection (PT) transfer, PT goal 1  -SC     Gait Training Goal Selection (PT) gait training, PT goal 1  -[facility] Name 07/10/18 1515          Bed Mobility Goal 1 (PT)    Activity/Assistive Device (Bed Mobility Goal 1, PT) supine to sit  -SC     [place] Level/Cues Needed (Bed Mobility Goal 1, PT) independent  -SC     Time Frame (Bed Mobility Goal  1, PT) long term goal (LTG);10 days  -SC     Row Name 07/10/18 1515          Transfer Goal 1 (PT)    Activity/Assistive Device (Transfer Goal 1, PT) sit-to-stand/stand-to-sit  -SC     [place] Level/Cues Needed (Transfer Goal 1, PT) conditional independence  -SC     Time Frame (Transfer Goal 1, PT) long term goal (LTG);10 days  -SC     Row Name 07/10/18 1515          Gait Training Goal 1 (PT)    Activity/Assistive Device (Gait Training Goal 1, PT) gait (walking locomotion);walker, rolling  -SC     [place] Level (Gait Training Goal 1, PT) conditional independence  -SC     Distance (Gait Goal 1, PT) 350  -SC     Time Frame (Gait Training Goal 1, PT) long term goal (LTG);10 days  -SC     Row Name 07/10/18 1515          Positioning and Restraints    Pre-Treatment Position sitting in chair/recliner  -SC     Post Treatment Position chair  -SC     In Chair sitting;call light within reach;encouraged to call for assist;exit alarm on  -SC     Row Name 07/10/18 1515          Living Environment    Home Accessibility stairs to enter home  -SC       User Key  (r) = Recorded By, (t) = Taken By, (c) = Cosigned By    Initials Name Provider Type    SC Hilda Naidu PT Physical Therapist          Physical Therapy Education     Title: PT OT SLP Therapies (Active)     Topic: Physical Therapy (Active)     Point: Mobility training (Active)    Learning Progress Summary     Learner Status Readiness Method Response Comment Documented by    Patient Active Eager E NR reviewed safety with mobiltiy SC 07/10/18 1708          Point: Home exercise program (Active)    Learning Progress Summary     Learner Status Readiness Method Response Comment Documented by    Patient Active Eager E NR reviewed safety with mobiltiy SC 07/10/18 1708          Point: Body mechanics (Active)    Learning Progress Summary     Learner Status Readiness Method Response Comment Documented by    Patient Active Eager E NR reviewed safety with mobiltiy SC 07/10/18  1708          Point: Precautions (Active)    Learning Progress Summary     Learner Status Readiness Method Response Comment Documented by    Patient Active Vivianeer E NR reviewed safety with mobiltiy SC 07/10/18 1708                      User Key     Initials Effective Dates Name Provider Type Discipline    SC 06/19/15 -  Hilda Naidu, PT Physical Therapist PT                PT Recommendation and Plan  Anticipated Discharge Disposition (PT): home with home health  Planned Therapy Interventions (PT Eval): bed mobility training, gait training, home exercise program, patient/family education, postural re-education, strengthening, transfer training  Therapy Frequency (PT Clinical Impression): daily  Outcome Summary/Treatment Plan (PT)  Anticipated Discharge Disposition (PT): home with home health  Plan of Care Reviewed With: patient  Outcome Summary: Patient presents with L lower extremity weakness in L123 pattern. Some buckling during ambulation requireing walker for safety.  WIll need continued skilled PT at d/c.          Outcome Measures     Row Name 07/10/18 1515 07/10/18 0745          How much help from another person do you currently need...    Turning from your back to your side while in flat bed without using bedrails? 4  -SC  --     Moving from lying on back to sitting on the side of a flat bed without bedrails? 3  -SC  --     Moving to and from a bed to a chair (including a wheelchair)? 3  -SC  --     Standing up from a chair using your arms (e.g., wheelchair, bedside chair)? 3  -SC  --     Climbing 3-5 steps with a railing? 3  -SC  --     To walk in hospital room? 3  -SC  --     AM-PAC 6 Clicks Score 19  -SC  --        How much help from another is currently needed...    Putting on and taking off regular lower body clothing?  -- 2  -AC     Bathing (including washing, rinsing, and drying)  -- 2  -AC     Toileting (which includes using toilet bed pan or urinal)  -- 3  -AC     Putting on and taking off regular  upper body clothing  -- 3  -AC     Taking care of personal grooming (such as brushing teeth)  -- 4  -AC     Eating meals  -- 4  -AC     Score  -- 18  -AC        Functional Assessment    Outcome Measure Options AM-PAC 6 Clicks Basic Mobility (PT)  -SC AM-PAC 6 Clicks Daily Activity (OT)  -AC       User Key  (r) = Recorded By, (t) = Taken By, (c) = Cosigned By    Initials Name Provider Type    SC Hilda Naidu, PT Physical Therapist    PHILOMENA Smith, OT Occupational Therapist           Time Calculation:         PT Charges     Row Name 07/10/18 1714             Time Calculation    Start Time 1515  -SC      PT Received On 07/10/18  -SC      PT Goal Re-Cert Due Date 07/20/18  -SC        User Key  (r) = Recorded By, (t) = Taken By, (c) = Cosigned By    Initials Name Provider Type    SC Hilda Naidu, PT Physical Therapist        Therapy Suggested Charges     Code   Minutes Charges    None           Therapy Charges for Today     Code Description Service Date Service Provider Modifiers Qty    61626999639 HC PT MOBILITY CURRENT 7/10/2018 Hilda Naidu, PT GP, CJ 1    94152305976 HC PT MOBILITY PROJECTED 7/10/2018 Hilda Naidu, PT GP, CI 1    54322071569 HC PT EVAL LOW COMPLEXITY 4 7/10/2018 Hilda Naidu, PT GP 1    54262588336 HC PT THER SUPP EA 15 MIN 7/10/2018 Hilda Naidu, PT GP 2          PT G-Codes  Outcome Measure Options: AM-PAC 6 Clicks Basic Mobility (PT)  Score: 19  Functional Limitation: Mobility: Walking and moving around  Mobility: Walking and Moving Around Current Status (): At least 20 percent but less than 40 percent impaired, limited or restricted  Mobility: Walking and Moving Around Goal Status (): At least 1 percent but less than 20 percent impaired, limited or restricted      Hilda Naidu PT  7/10/2018         Electronically signed by Hilda Naidu PT at 7/10/2018  5:14 PM     Hilda Naidu PT at 7/11/2018  3:42 PM  Version 1 of 1         Problem: Patient Care Overview  Goal:  Plan of Care Review  Outcome: Ongoing (interventions implemented as appropriate)   18 1541   Plan of Care Review   Progress improving   OTHER   Outcome Summary More endurance with ambulation today. SATs stable and not wheezing noted. L leg remains weak at quads and hip flexors           Electronically signed by Hilda Naidu PT at 2018  3:42 PM     Hilda Naidu PT at 2018  3:44 PM  Version 1 of 1         Acute Care - Physical Therapy Treatment Note   [facility]     Patient Name: Zee Stewart  : 1940  MRN: [id number]  Today's Date: 2018  Onset of Illness/Injury or Date of Surgery: 18  Date of Referral to PT: 18  Referring Physician: Dr Loera    Admit Date: 2018    Visit Dx:    ICD-10-CM ICD-9-CM   1. Acute congestive heart failure, unspecified congestive heart failure type (CMS/HCC) I50.9 428.0   2. Hypoxia R09.02 799.02   3. Myositis of lower extremity, unspecified laterality, unspecified myositis type M60.869 729.1   4. Abnormal blood level of uric acid E79.0 790.6   5. Positive D dimer R79.89 790.92   6. Chronic anticoagulation Z79.01 V58.61   7. Chronic atrial fibrillation (CMS/HCC) I48.2 427.31   8. DDD (degenerative disc disease), lumbar M51.36 722.52   9. Deterioration in ability to walk R26.2 781.2   10. Hypokalemia E87.6 276.8   11. Impaired mobility and ADLs Z74.09 799.89   12. Impaired functional mobility, balance, gait, and endurance Z74.09 V49.89     Patient Active Problem List   Diagnosis   • Chronic atrial fibrillation (CMS/HCC)   • Hypokalemia   • Gout   • Hypertension   • Elevated CK   • Acute diastolic congestive heart failure (CMS/HCC)   • Anemia   • Leukocytosis   • Warfarin anticoagulation   • Hyperglycemia   • Pulmonary hypertension   • Moderate to severe tricuspid regurgitation   • Back pain due to recent fall       Therapy Treatment          Rehabilitation Treatment Summary     Row Name 18 1320             Treatment Time/Intention     Discipline physical therapist  -SC      Document Type therapy note (daily note)  -SC      Subjective Information no complaints  -SC      Mode of Treatment physical therapy  -SC      Patient/Family Observations daughter  -SC      Care Plan Review risks/benefits reviewed  -SC      Therapy Frequency (PT Clinical Impression) daily  -SC      Patient Effort good  -SC      Existing Precautions/Restrictions fall  -SC      Recorded by [SC] Hilda Naidu, PT 07/11/18 1540      Row Name 07/11/18 1320             Vital Signs    Pre SpO2 (%) 96  -SC      O2 Delivery Pre Treatment room air  -SC      Intra SpO2 (%) 95  -SC      O2 Delivery Intra Treatment room air  -SC      Post SpO2 (%) 96  -SC      O2 Delivery Post Treatment room air  -SC      Recorded by [SC] Hilda Naidu, PT 07/11/18 1540      Row Name 07/11/18 1320             Cognitive Assessment/Intervention- PT/OT    Orientation Status (Cognition) oriented x 4  -SC      Follows Commands (Cognition) WNL  -SC      Recorded by [SC] Hilda Naidu, PT 07/11/18 1540      Row Name 07/11/18 1320             Safety Issues, Functional Mobility    Impairments Affecting Function (Mobility) balance;strength  -SC      Recorded by [SC] Hilda Naidu, PT 07/11/18 1540      Row Name 07/11/18 1320             Bed Mobility Assessment/Treatment    Comment (Bed Mobility) uic  -SC      Recorded by [SC] Hilda Naidu, PT 07/11/18 1540      Row Name 07/11/18 1320             Transfer Assessment/Treatment    Transfer Assessment/Treatment sit-stand transfer;stand-sit transfer  -SC      Comment (Transfers) demonstrated good technique  -SC      Recorded by [SC] Hilda Naidu, PT 07/11/18 1540      Row Name 07/11/18 1320             Sit-Stand Transfer    Sit-Stand [place] (Transfers) conditional independence  -SC      Assistive Device (Sit-Stand Transfers) walker, front-wheeled  -SC      Recorded by [SC] Hilda Naidu, PT 07/11/18 1540      Row Name 07/11/18 1320             Stand-Sit  Transfer    Stand-Sit Zap (Transfers) conditional independence  -SC      Assistive Device (Stand-Sit Transfers) walker, front-wheeled  -SC      Recorded by [SC] Hilda Naidu, PT 07/11/18 1540      Row Name 07/11/18 1320             Gait/Stairs Assessment/Training    33097 - Gait Training Minutes  18  -SC      Gait/Stairs Assessment/Training gait/ambulation independence  -SC      [place]ap Level (Gait) contact guard;verbal cues  -SC      Assistive Device (Gait) walker, front-wheeled  -SC      Distance in Feet (Gait) 250  -SC      Pattern (Gait) swing-through  -SC      Deviations/Abnormal Patterns (Gait) sajan decreased  -SC      Comment (Gait/Stairs) cues for staying close to walker. No buckling noted, no wheezing noted.  -SC      Recorded by [SC] Hilda Naidu, PT 07/11/18 1540      Row Name 07/11/18 1320             Motor Skills Assessment/Interventions    Additional Documentation Therapeutic Exercise (Group);Therapeutic Exercise Interventions (Group)  -SC      Recorded by [SC] Hilda Naidu, PT 07/11/18 1540      Row Name 07/11/18 1320             Therapeutic Exercise    13127 - PT Therapeutic Exercise Minutes 6  -SC      Recorded by [SC] Hilda Naidu, PT 07/11/18 1540      Row Name 07/11/18 1320             Therapeutic Exercise    Lower Extremity (Therapeutic Exercise) LAQ (long arc quad), bilateral;marching while seated  -SC      Exercise Type (Therapeutic Exercise) AROM (active range of motion)  -SC      Position (Therapeutic Exercise) seated  -SC      Sets/Reps (Therapeutic Exercise) 10  -SC      Recorded by [SC] Hilda Naidu, PT 07/11/18 1540      Row Name 07/11/18 1320             Balance    Balance dynamic standing balance  -SC      Recorded by [SC] Hilda Naidu, PT 07/11/18 1540      Row Name 07/11/18 1320             Dynamic Standing Balance    Level of Zap, Reaches Outside Midline (Standing, Dynamic Balance) contact guard assist   walker  -SC      Recorded by [SC] Hilda  AGUILAR Elysia, PT 07/11/18 1540      Row Name 07/11/18 1320             Positioning and Restraints    Pre-Treatment Position sitting in chair/recliner  -SC      Post Treatment Position chair  -SC      In Chair sitting;call light within reach;encouraged to call for assist;exit alarm on  -SC      Recorded by [SC] Hilda Stevensp, PT 07/11/18 1540        User Key  (r) = Recorded By, (t) = Taken By, (c) = Cosigned By    Initials Name Effective Dates Discipline    SC Hilda SHEIKH Elysia, PT 06/19/15 -  PT                     Physical Therapy Education     Title: PT OT SLP Therapies (Active)     Topic: Physical Therapy (Done)     Point: Mobility training (Done)    Learning Progress Summary     Learner Status Readiness Method Response Comment Documented by    Patient Done EVA Ingram NR reviewed safety with mobility SC 07/11/18 1540     Active Allegra WIGGINS reviewed safety with mobiltiy SC 07/10/18 1708          Point: Home exercise program (Done)    Learning Progress Summary     Learner Status Readiness Method Response Comment Documented by    Patient Done EVA Ingram NR reviewed safety with mobility SC 07/11/18 1540     Active Allegra WIGGINS reviewed safety with mobiltiy SC 07/10/18 1708          Point: Body mechanics (Done)    Learning Progress Summary     Learner Status Readiness Method Response Comment Documented by    Patient Done EVA Ingram NR reviewed safety with mobility SC 07/11/18 1540     Active Allegra WIGGINS reviewed safety with mobiltiy SC 07/10/18 1708          Point: Precautions (Done)    Learning Progress Summary     Learner Status Readiness Method Response Comment Documented by    Patient Done EVA Ingram NR reviewed safety with mobility SC 07/11/18 1540     Active Allegra WIGGINS reviewed safety with mobiltiy SC 07/10/18 1708                      User Key     Initials Effective Dates Name Provider Type Discipline    SC 06/19/15 -  Hilda AGUILAR Elysia, PT Physical Therapist PT                    PT Recommendation and  Plan  Anticipated Discharge Disposition (PT): home with home health  Planned Therapy Interventions (PT Eval): bed mobility training, gait training, home exercise program, patient/family education, postural re-education, strengthening, transfer training  Therapy Frequency (PT Clinical Impression): daily  Outcome Summary/Treatment Plan (PT)  Anticipated Discharge Disposition (PT): home with home health  Plan of Care Reviewed With: patient  Progress: improving  Outcome Summary: More endurance wtih ambulation today. Sats stable and not wheezing noted. L leg remains weak  at quads and hip flexors          Outcome Measures     Row Name 07/11/18 1320 07/10/18 1515 07/10/18 0745       How much help from another person do you currently need...    Turning from your back to your side while in flat bed without using bedrails? 4  -SC 4  -SC  --    Moving from lying on back to sitting on the side of a flat bed without bedrails? 4  -SC 3  -SC  --    Moving to and from a bed to a chair (including a wheelchair)? 3  -SC 3  -SC  --    Standing up from a chair using your arms (e.g., wheelchair, bedside chair)? 3  -SC 3  -SC  --    Climbing 3-5 steps with a railing? 3  -SC 3  -SC  --    To walk in hospital room? 3  -SC 3  -SC  --    AM-PAC 6 Clicks Score 20  -SC 19  -SC  --       How much help from another is currently needed...    Putting on and taking off regular lower body clothing?  --  -- 2  -AC    Bathing (including washing, rinsing, and drying)  --  -- 2  -AC    Toileting (which includes using toilet bed pan or urinal)  --  -- 3  -AC    Putting on and taking off regular upper body clothing  --  -- 3  -AC    Taking care of personal grooming (such as brushing teeth)  --  -- 4  -AC    Eating meals  --  -- 4  -AC    Score  --  -- 18  -AC       Functional Assessment    Outcome Measure Options AM-PAC 6 Clicks Basic Mobility (PT)  -SC AM-PAC 6 Clicks Basic Mobility (PT)  -SC AM-PAC 6 Clicks Daily Activity (OT)  -AC      User Key  (r) =  Recorded By, (t) = Taken By, (c) = Cosigned By    Initials Name Provider Type    SC Hilda Naidu, PT Physical Therapist    PHILOMENA Smith, OT Occupational Therapist           Time Calculation:         PT Charges     Row Name 07/11/18 1543 07/11/18 1320          Time Calculation    Start Time 1320  -SC  --     PT Received On 07/11/18  -SC  --     PT Goal Re-Cert Due Date 07/20/18  -SC  --        Time Calculation- PT    Total Timed Code Minutes- PT 23 minute(s)  -SC  --        Timed Charges    27028 - PT Therapeutic Exercise Minutes  -- 6  -SC     28708 - Gait Training Minutes   -- 18  -SC       User Key  (r) = Recorded By, (t) = Taken By, (c) = Cosigned By    Initials Name Provider Type    SC Hilda SHEIKH Elysia, PT Physical Therapist        Therapy Suggested Charges     Code   Minutes Charges    28384 (CPT®) Hc Pt Neuromusc Re Education Ea 15 Min      85903 (CPT®) Hc Pt Ther Proc Ea 15 Min 6 1    01842 (CPT®) Hc Gait Training Ea 15 Min 18 1    77533 (CPT®) Hc Pt Therapeutic Act Ea 15 Min      38553 (CPT®) Hc Pt Manual Therapy Ea 15 Min      89727 (CPT®) Hc Pt Iontophoresis Ea 15 Min      72184 (CPT®) Hc Pt Elec Stim Ea-Per 15 Min      52906 (CPT®) Hc Pt Ultrasound Ea 15 Min      16109 (CPT®) Hc Pt Self Care/Mgmt/Train Ea 15 Min      Total  24 2        Therapy Charges for Today     Code Description Service Date Service Provider Modifiers Qty    89286031984 HC PT MOBILITY CURRENT 7/10/2018 Shearon A Elysia, PT GP, CJ 1    05397447433 HC PT MOBILITY PROJECTED 7/10/2018 Shearon A Elysia, PT GP, CI 1    97176125544 HC PT EVAL LOW COMPLEXITY 4 7/10/2018 Shearon A Elysia, PT GP 1    40307104945 HC PT THER SUPP EA 15 MIN 7/10/2018 Shearon A Elysia, PT GP 2    46808907882 HC PT THER PROC EA 15 MIN 7/11/2018 Shearon A Elysia, PT GP 1    16775489125 HC GAIT TRAINING EA 15 MIN 7/11/2018 Shearon A Elysia, PT GP 1          PT G-Codes  Outcome Measure Options: AM-PAC 6 Clicks Basic Mobility (PT)  Score: 19  Functional Limitation: Mobility:  Walking and moving around  Mobility: Walking and Moving Around Current Status (): At least 20 percent but less than 40 percent impaired, limited or restricted  Mobility: Walking and Moving Around Goal Status (): At least 1 percent but less than 20 percent impaired, limited or restricted    Hilda Naidu, PT  2018         Electronically signed by Hilda Naidu, PT at 2018  3:44 PM          Occupational Therapy Notes (last 72 hours) (Notes from 2018 10:17 AM through 2018 10:17 AM)      Nilda Smith, OT at 7/10/2018  9:37 AM          Acute Care - Occupational Therapy Initial Evaluation  [facility]     Patient Name: Zee Stewart  : 1940  MRN: [id number]  Today's Date: 7/10/2018  Onset of Illness/Injury or Date of Surgery: 18  Date of Referral to OT: 18  Referring Physician: MD Evraisto    Admit Date: 2018       ICD-10-CM ICD-9-CM   1. Acute congestive heart failure, unspecified congestive heart failure type (CMS/HCC) I50.9 428.0   2. Hypoxia R09.02 799.02   3. Myositis of lower extremity, unspecified laterality, unspecified myositis type M60.869 729.1   4. Abnormal blood level of uric acid E79.0 790.6   5. Positive D dimer R79.89 790.92   6. Chronic anticoagulation Z79.01 V58.61   7. Chronic atrial fibrillation (CMS/HCC) I48.2 427.31   8. DDD (degenerative disc disease), lumbar M51.36 722.52   9. Deterioration in ability to walk R26.2 781.2   10. Hypokalemia E87.6 276.8   11. Impaired mobility and ADLs Z74.09 799.89     Patient Active Problem List   Diagnosis   • Chronic atrial fibrillation (CMS/HCC)   • Hypokalemia   • Gout   • Hypertension   • Elevated CK   • Acute congestive heart failure (CMS/HCC)   • Anemia   • Leukocytosis   • Warfarin anticoagulation   • Hyperglycemia     Past Medical History:   Diagnosis Date   • Arthritis    • Atrial fibrillation (CMS/HCC)    • Cellulitis    • CHF (congestive heart failure) (CMS/HCC)    • Coronary artery disease    • Gout     • Hypertension      Past Surgical History:   Procedure Laterality Date   • ABDOMINAL SURGERY     • CYSTOSCOPY W/ LASER LITHOTRIPSY     • TUBAL ABDOMINAL LIGATION     • URETEROSCOPY STENT INSERTION            OT ASSESSMENT FLOWSHEET (last 72 hours)      Occupational Therapy Evaluation     Row Name 07/10/18 0745                   OT Evaluation Time/Intention    Subjective Information complains of;weakness  -AC        Document Type evaluation  -AC        Mode of Treatment occupational therapy  -AC        Patient Effort good  -AC        Symptoms Noted During/After Treatment increased pain  -AC           General Information    Patient Profile Reviewed? yes  -AC        Onset of Illness/Injury or Date of Surgery 07/09/18  -        Referring Physician MD Evaristo  -AC        Patient Observations alert;cooperative;agree to therapy  -AC        General Observations of Patient Pt received sidelying in bed sleeping.  Pt on O2  -AC        Prior Level of Function independent:;all household mobility;ADL's  -AC        Equipment Currently Used at Home cane, straight;cane, quad;walker, rolling;commode, bedside;shower chair   has borrowed w/c  -AC        Pertinent History of Current Functional Problem Pt admit with SOA, recent fall d/t LE weakness,  BLE edema, back pain radiating to LLE.  Diagnosed with CHF  -AC        Existing Precautions/Restrictions fall;oxygen therapy device and L/min  -AC        Limitations/Impairments hearing  -AC        Equipment Issued to Patient reacher;sock aid  -AC        Risks Reviewed patient:;LOB;increased discomfort;change in vital signs  -AC        Benefits Reviewed patient:;improve function;increase independence;increase strength;increase balance;increase knowledge  -AC        Barriers to Rehab none identified  -AC           Relationship/Environment    Primary Source of Support/Comfort child(henok)   daughter lives nearby  -AC        Lives With alone  -AC           Resource/Environmental Concerns     Current Living Arrangements home/apartment/condo  -AC           Home Main Entrance    Number of Stairs, Main Entrance one  -AC        Stair Railings, Main Entrance none  -AC           Stairs Within Home, Primary    Stairs, Within Home, Primary 12   to basement for laundry  -AC        Stair Railings, Within Home, Primary none  -AC           Cognitive Assessment/Intervention- PT/OT    Orientation Status (Cognition) oriented x 3  -AC        Follows Commands (Cognition) WFL  -AC           Bed Mobility Assessment/Treatment    Bed Mobility Assessment/Treatment supine-sit  -AC        Supine-Sit [place] (Bed Mobility) conditional independence  -AC        Assistive Device (Bed Mobility) bed rails  -AC        Comment (Bed Mobility) educated to logroll for comfort  -AC           Functional Mobility    Functional Mobility- Ind. Level verbal cues required;contact guard assist  -AC        Functional Mobility- Device rolling walker  -AC        Functional Mobility-Distance (Feet) 20  -AC           Transfer Assessment/Treatment    Transfer Assessment/Treatment sit-stand transfer;stand-sit transfer;toilet transfer  -AC        Comment (Transfers) VCs for hand placement  -AC           Sit-Stand Transfer    Sit-Stand [place] (Transfers) verbal cues;contact guard  -AC        Assistive Device (Sit-Stand Transfers) walker, front-wheeled  -AC           Stand-Sit Transfer    Stand-Sit [place] (Transfers) verbal cues;supervision  -AC        Assistive Device (Stand-Sit Transfers) walker, front-wheeled  -AC           Toilet Transfer    Type (Toilet Transfer) sit-stand;stand-sit  -AC        [place] Level (Toilet Transfer) contact guard  -AC        Assistive Device (Toilet Transfer) grab bars/safety frame   increased effort to stand from toilet  -AC           ADL Assessment/Intervention    85475 - OT Self Care/Mgmt Minutes 8  -AC        BADL Assessment/Intervention lower body dressing;toileting  -AC           Lower Body  Dressing Assessment/Training    Lower Body Dressing Cooks Level doff;don;socks  -        Assistive Devices (Lower Body Dressing) reacher;sock-aid  -        Lower Body Dressing Position unsupported standing  -        Comment (Lower Body Dressing) able to don right but not left, issued AE, and required Vcs to doff sock min A to don L   -AC           Toileting Assessment/Training    Cooks Level (Toileting) toileting skills;perform perineal hygiene;adjust/manage clothing  -        Assistive Devices (Toileting) grab bar/safety frame  -        Toileting Position supported standing;supported sitting  -        Comment (Toileting) inde with hygiene , min A clothing mgmt  -           BADL Safety/Performance    Impairments, BADL Safety/Performance balance;pain;range of motion  -        Skilled BADL Treatment/Intervention BADL process/adaptation training;adaptive equipment training  -           General ROM    GENERAL ROM COMMENTS BUE WFL  -           General Assessment (Manual Muscle Testing)    Comment, General Manual Muscle Testing (MMT) Assessment BUE 4/5  -           Sensory Assessment/Intervention    Sensory General Assessment --   BUE intact  -        Additional Documentation Hearing Assessment (Group);Vision Assessment/Intervention (Group)  -           Hearing Assessment    Hearing Status hearing impairment, bilaterally  -           Vision Assessment/Intervention    Visual Impairment/Limitations corrective lenses full time  -           Positioning and Restraints    Pre-Treatment Position in bed  -        Post Treatment Position chair  -        In Chair reclined;call light within reach;encouraged to call for assist;exit alarm on  -AC           Clinical Impression (OT)    Date of Referral to OT 07/09/18  -        OT Diagnosis ADL decline  -        Patient/Family Goals Statement (OT Eval) increase ADL independence  -        Criteria for Skilled Therapeutic Interventions  Met (OT Eval) yes;treatment indicated  -AC        Rehab Potential (OT Eval) good, to achieve stated therapy goals  -AC        Therapy Frequency (OT Eval) daily  -AC        Care Plan Review (OT) care plan/treatment goals reviewed;risks/benefits reviewed  -AC        Anticipated Discharge Disposition (OT) home with home health  -AC           Vital Signs    Pre Systolic BP Rehab 126  -AC        Pre Treatment Diastolic BP 45  -AC        Posttreatment Heart Rate (beats/min) 79  -AC        Pre SpO2 (%) 95  -AC        O2 Delivery Pre Treatment supplemental O2  -AC        Intra SpO2 (%) 88  -AC        O2 Delivery Intra Treatment room air  -AC        Post SpO2 (%) 97  -AC        O2 Delivery Post Treatment supplemental O2  -AC        Pre Patient Position Supine  -AC        Intra Patient Position Standing  -AC        Post Patient Position Sitting  -AC           Planned OT Interventions    Planned Therapy Interventions (OT Eval) BADL retraining;adaptive equipment training;strengthening exercise;transfer/mobility retraining;functional balance retraining  -AC           OT Goals    Transfer Goal Selection (OT) transfer, OT goal 1  -AC        Dressing Goal Selection (OT) dressing, OT goal 1  -AC        Strength Goal Selection (OT) strength, OT goal 1  -AC        Problem Specific Goal Selection (OT) problem specific goal 1, OT  -AC        Additional Documentation Problem Specific Goal Selection (OT) (Row);Strength Goal Selection (OT) (Row)  -AC           Transfer Goal 1 (OT)    Activity/Assistive Device (Transfer Goal 1, OT) bed-to-chair/chair-to-bed;toilet;walker, rolling  -AC        [place] Level/Cues Needed (Transfer Goal 1, OT) supervision required  -AC        Time Frame (Transfer Goal 1, OT) by discharge  -AC           Dressing Goal 1 (OT)    Activity/Assistive Device (Dressing Goal 1, OT) lower body dressing;reacher;sock-aid  -AC        [place]/Cues Needed (Dressing Goal 1, OT) supervision required  -AC        Time  Frame (Dressing Goal 1, OT) by discharge  -AC           Strength Goal 1 (OT)    Strength Goal 1 (OT) Pt will complet 10-15 reps BUE AROM daily as needed to support ADLs  -AC        Time Frame (Strength Goal 1, OT) by discharge  -AC           Problem Specific Goal 1 (OT)    Problem Specific Goal 1 (OT) Pt will complete 10 min fnctiaonl activity with O2 sat 90% or greater  -        Time Frame (Problem Specific Goal 1, OT) by discharge  -AC           Living Environment    Home Accessibility stairs to enter home;stairs within home;tub/shower is not walk in  -AC          User Key  (r) = Recorded By, (t) = Taken By, (c) = Cosigned By    Initials Name Effective Dates    AC Nilda Smith, OT 06/23/15 -            Occupational Therapy Education     Title: PT OT SLP Therapies (Active)     Topic: Occupational Therapy (Active)     Point: ADL training (Done)     Description: Instruct learner(s) on proper safety adaptation and remediation techniques during self care or transfers.   Instruct in proper use of assistive devices.   Learning Progress Summary     Learner Status Readiness Method Response Comment Documented by    Patient Done Acceptance E VU benefits of activity, role of OT  07/10/18 0931                      User Key     Initials Effective Dates Name Provider Type Discipline     06/23/15 -  Nilda Smith, OT Occupational Therapist OT                  OT Recommendation and Plan  Outcome Summary/Treatment Plan (OT)  Anticipated Discharge Disposition (OT): home with home health  Planned Therapy Interventions (OT Eval): BADL retraining, adaptive equipment training, strengthening exercise, transfer/mobility retraining, functional balance retraining  Therapy Frequency (OT Eval): daily  Outcome Summary: OT eval complete.  Pt presents with decreased functional endurance, weakness, decreased balance and pain with mobility which limits independence with self care.  Pt issued sockaide and reacher and intiated ed for LBD.  Pt  CGA with bed mobility , min A clothing mgmt post toileting,  min A to don L sock with AE.  OT will follow to advance pt toward PLOF with self care.  Recommend home with HHOT. upon d/c.           Outcome Measures     Row Name 07/10/18 0745             How much help from another is currently needed...    Putting on and taking off regular lower body clothing? 2  -AC      Bathing (including washing, rinsing, and drying) 2  -AC      Toileting (which includes using toilet bed pan or urinal) 3  -AC      Putting on and taking off regular upper body clothing 3  -AC      Taking care of personal grooming (such as brushing teeth) 4  -AC      Eating meals 4  -AC      Score 18  -AC         Functional Assessment    Outcome Measure Options AM-PAC 6 Clicks Daily Activity (OT)  -AC        User Key  (r) = Recorded By, (t) = Taken By, (c) = Cosigned By    Initials Name Provider Type    AC Nilda Smith OT Occupational Therapist          Time Calculation:   OT Start Time: 0745  Therapy Suggested Charges     Code   Minutes Charges    87366 (CPT®) Hc Ot Neuromusc Re Education Ea 15 Min      78007 (CPT®) Hc Ot Ther Proc Ea 15 Min      06810 (CPT®) Hc Ot Therapeutic Act Ea 15 Min      61471 (CPT®) Hc Ot Manual Therapy Ea 15 Min      19800 (CPT®) Hc Ot Iontophoresis Ea 15 Min      37960 (CPT®) Hc Ot Elec Stim Ea-Per 15 Min      94110 (CPT®) Hc Ot Ultrasound Ea 15 Min      02241 (CPT®) Hc Ot Self Care/Mgmt/Train Ea 15 Min 8 1    Total  8 1        Therapy Charges for Today     Code Description Service Date Service Provider Modifiers Qty    30793421959 HC OT SELF CARE/MGMT/TRAIN EA 15 MIN 7/10/2018 Nilda Smith OT GO 1    35362684362 HC OT EVAL MOD COMPLEXITY 4 7/10/2018 Nilad Smith OT GO 1    82746434033 HC OT THER SUPP EA 15 MIN 7/10/2018 Nilda Smith OT GO 1               Nilda Smith OT  7/10/2018    Electronically signed by Nilda Smith OT at 7/10/2018  9:38 AM     Nilda Smith OT at 7/10/2018  9:36 AM          Problem:  Patient Care Overview  Goal: Plan of Care Review  Outcome: Ongoing (interventions implemented as appropriate)   07/10/18 5972   Plan of Care Review   Progress (Evaluation)   OTHER   Outcome Summary OT eval complete. Pt presents with decreased functional endurance, weakness, decreased balance and pain with mobility which limits independence with self care. Pt issued sockaide and reacher and intiated ed for LBD. Pt CGA with bed mobility , min A clothing mgmt post toileting, min A to don L sock with AE. OT will follow to advance pt toward PLOF with self care. Recommend home with HHOT. upon d/c.            Electronically signed by Nilda Smith OT at 7/10/2018  9:36 AM

## 2018-07-12 NOTE — PLAN OF CARE
Problem: Patient Care Overview  Goal: Plan of Care Review  Outcome: Ongoing (interventions implemented as appropriate)   07/12/18 1040   Coping/Psychosocial   Plan of Care Reviewed With patient   Plan of Care Review   Progress improving   OTHER   Outcome Summary Pt with no complaints upon entering room. Pt up in chair. Pt ambulated with RW and SBA with cues for posture and proper breathing. No LOB noted. Reviewed HEP wtih pt requiring min A for recalling. Pt anticipated DC home this date to home with HH services and family as needed.

## 2018-07-12 NOTE — THERAPY TREATMENT NOTE
Acute Care - Physical Therapy Treatment Note  Psychiatric     Patient Name: Zee Stewart  : 1940  MRN: [id number]  Today's Date: 2018  Onset of Illness/Injury or Date of Surgery: 18  Date of Referral to PT: 18  Referring Physician: Dr Loera    Admit Date: 2018    Visit Dx:    ICD-10-CM ICD-9-CM   1. Acute congestive heart failure, unspecified congestive heart failure type (CMS/HCC) I50.9 428.0   2. Hypoxia R09.02 799.02   3. Myositis of lower extremity, unspecified laterality, unspecified myositis type M60.869 729.1   4. Abnormal blood level of uric acid E79.0 790.6   5. Positive D dimer R79.89 790.92   6. Chronic anticoagulation Z79.01 V58.61   7. Chronic atrial fibrillation (CMS/HCC) I48.2 427.31   8. DDD (degenerative disc disease), lumbar M51.36 722.52   9. Deterioration in ability to walk R26.2 781.2   10. Hypokalemia E87.6 276.8   11. Impaired mobility and ADLs Z74.09 799.89   12. Impaired functional mobility, balance, gait, and endurance Z74.09 V49.89     Patient Active Problem List   Diagnosis   • Chronic atrial fibrillation (CMS/HCC)   • Hypokalemia   • Gout   • Hypertension   • Elevated CK   • Acute diastolic congestive heart failure (CMS/HCC)   • Anemia   • Leukocytosis   • Warfarin anticoagulation   • Hyperglycemia   • Pulmonary hypertension   • Moderate to severe tricuspid regurgitation   • Back pain due to recent fall       Therapy Treatment          Rehabilitation Treatment Summary     Row Name 18 1040             Treatment Time/Intention    Discipline physical therapist  -AA      Document Type therapy note (daily note)  -AA      Subjective Information no complaints  -AA      Mode of Treatment individual therapy;physical therapy  -AA      Patient/Family Observations no family present  -AA      Care Plan Review risks/benefits reviewed  -AA      Patient Effort excellent  -AA      Existing Precautions/Restrictions fall  -AA      Patient Response to Treatment pt responded  well with no pain or LOB  -AA      Recorded by [AA] April TOBY Palma, PT 07/12/18 1136      Row Name 07/12/18 1040             Vital Signs    Pre SpO2 (%) 95  -AA      O2 Delivery Pre Treatment room air  -AA      Intra SpO2 (%) 95  -AA      O2 Delivery Intra Treatment room air  -AA      Post SpO2 (%) 98  -AA      O2 Delivery Post Treatment room air  -AA      Pre Patient Position Sitting  -AA      Intra Patient Position Standing  -AA      Post Patient Position Sitting  -AA      Recorded by [AA] April TOBY Palma, PT 07/12/18 1136      Row Name 07/12/18 1040             Cognitive Assessment/Intervention    Additional Documentation Cognitive Assessment/Intervention (Group)  -AA      Recorded by [AA] April TOBY AltmanLouie, PT 07/12/18 1136      Row Name 07/12/18 1040             Cognitive Assessment/Intervention- PT/OT    Affect/Mental Status (Cognitive) WNL  -AA      Orientation Status (Cognition) oriented x 4  -AA      Follows Commands (Cognition) WNL  -AA      Cognitive Function (Cognitive) WNL  -AA      Recorded by [AA] April TOBY AltmanLouie, PT 07/12/18 1136      Row Name 07/12/18 1040             Transfer Assessment/Treatment    Transfer Assessment/Treatment sit-stand transfer;stand-sit transfer  -AA      Recorded by [AA] April TOBY AltmanLouie, PT 07/12/18 1136      Row Name 07/12/18 1040             Sit-Stand Transfer    Sit-Stand [place] (Transfers) conditional independence  -AA      Assistive Device (Sit-Stand Transfers) walker, front-wheeled  -AA      Recorded by [AA] April TOBY Louie, PT 07/12/18 1136      Row Name 07/12/18 1040             Stand-Sit Transfer    Stand-Sit [place] (Transfers) conditional independence  -AA      Assistive Device (Stand-Sit Transfers) walker, front-wheeled  -AA      Recorded by [AA] April TOBY Louie, PT 07/12/18 1136      Row Name 07/12/18 1040             Gait/Stairs Assessment/Training    05679 - Gait Training Minutes  14  -AA      Gait/Stairs Assessment/Training gait/ambulation independence  -AA       [place] Level (Gait) stand by assist;verbal cues  -AA      Assistive Device (Gait) walker, front-wheeled  -AA      Distance in Feet (Gait) 300  -AA      Pattern (Gait) swing-through  -AA      Deviations/Abnormal Patterns (Gait) sajan decreased  -AA      Bilateral Gait Deviations forward flexed posture  -AA      Recorded by [AA] Latasha Palma, PT 07/12/18 1136      Row Name 07/12/18 1040             Therapeutic Exercise    63582 - PT Therapeutic Exercise Minutes 10  -AA      Recorded by [AA] April TOBY Palma, PT 07/12/18 1136      Row Name 07/12/18 1040             Therapeutic Exercise    Lower Extremity (Therapeutic Exercise) gluteal sets;LAQ (long arc quad), bilateral;marching while seated;other (see comments)   hip abduction B, trunk flexion stretching  -AA      Exercise Type (Therapeutic Exercise) AROM (active range of motion)  -AA      Position (Therapeutic Exercise) seated  -AA      Sets/Reps (Therapeutic Exercise) 2x10  -AA      Recorded by [AA] April TOBY AltmanLouie, PT 07/12/18 1136      Row Name 07/12/18 1040             Balance    Balance dynamic standing balance  -AA      Recorded by [AA] April TOBY Palma, PT 07/12/18 1136      Row Name 07/12/18 1040             Dynamic Standing Balance    Level of [place], Reaches Outside Midline (Standing, Dynamic Balance) contact guard assist  -AA      Time Able to Maintain Position, Reaches Outside Midline (Standing, Dynamic Balance) 3 to 4 minutes  -AA      Recorded by [AA] Latasha Palma, PT 07/12/18 1136      Row Name 07/12/18 1040             Positioning and Restraints    Pre-Treatment Position sitting in chair/recliner  -AA      Post Treatment Position chair  -AA      In Chair notified nsg;sitting;call light within reach;encouraged to call for assist  -AA      Recorded by [AA] Latasha Palma, PT 07/12/18 1136      Row Name 07/12/18 1040             Pain Assessment    Additional Documentation Pain Scale: Numbers Pre/Post-Treatment (Group)  -AA      Recorded by [AA]  Latasha Palma, PT 07/12/18 1136      Row Name 07/12/18 1040             Pain Scale: Numbers Pre/Post-Treatment    Pain Scale: Numbers, Pretreatment 0/10 - no pain  -AA      Pain Scale: Numbers, Post-Treatment 0/10 - no pain  -AA      Recorded by [AA] Latasha Palma, PT 07/12/18 1136      Row Name 07/12/18 1040             Coping    Observed Emotional State accepting;calm;cooperative  -AA      Verbalized Emotional State acceptance  -AA      Recorded by [AA] Latasha Palma, PT 07/12/18 1136      Row Name 07/12/18 1040             Plan of Care Review    Plan of Care Reviewed With patient  -AA      Recorded by [AA] Latasha Palma, PT 07/12/18 1136      Row Name 07/12/18 1040             Outcome Summary/Treatment Plan (PT)    Daily Summary of Progress (PT) progress toward functional goals as expected  -AA      Plan for Continued Treatment (PT) anticipated DC home today  -AA      Anticipated Discharge Disposition (PT) home with assist;home with home health  -AA      Recorded by [AA] Latasha Palma, PT 07/12/18 1136        User Key  (r) = Recorded By, (t) = Taken By, (c) = Cosigned By    Initials Name Effective Dates Discipline    AA Latasha Palam, PT 04/02/18 -  PT                     Physical Therapy Education     Title: PT OT SLP Therapies (Active)     Topic: Physical Therapy (Active)     Point: Mobility training (Active)    Learning Progress Summary     Learner Status Readiness Method Response Comment Documented by    Patient Active Eager E NR  AA 07/12/18 1137     Done EVA Ingram NR reviewed safety with mobility SC 07/11/18 1540     Active Allegra WIGGINS reviewed safety with mobiltiy SC 07/10/18 1708          Point: Home exercise program (Active)    Learning Progress Summary     Learner Status Readiness Method Response Comment Documented by    Patient Active Eager E NR  AA 07/12/18 1137     Done EVA Ingram NR reviewed safety with mobility SC 07/11/18 1540     Active Allegra WIGGINS reviewed safety with mobiltiy SC 07/10/18  1708          Point: Body mechanics (Active)    Learning Progress Summary     Learner Status Readiness Method Response Comment Documented by    Patient Active Eager E NR   07/12/18 1137     Done EVA Ingram NR reviewed safety with mobility SC 07/11/18 1540     Active Allegra TRIVEDI NR reviewed safety with mobiltiy SC 07/10/18 1708          Point: Precautions (Active)    Learning Progress Summary     Learner Status Readiness Method Response Comment Documented by    Patient Active Eager E NR   07/12/18 1137     Done EVA Ingram NR reviewed safety with mobility SC 07/11/18 1540     Active Allegra TRIVEDI NR reviewed safety with mobiltiy SC 07/10/18 1708                      User Key     Initials Effective Dates Name Provider Type Discipline    SC 06/19/15 -  Hilda Naidu, PT Physical Therapist PT     04/02/18 -  April TOBY Palma, PT Physical Therapist PT                    PT Recommendation and Plan  Anticipated Discharge Disposition (PT): home with assist, home with home health  Outcome Summary/Treatment Plan (PT)  Daily Summary of Progress (PT): progress toward functional goals as expected  Plan for Continued Treatment (PT): anticipated DC home today  Anticipated Discharge Disposition (PT): home with assist, home with home health  Plan of Care Reviewed With: patient  Progress: improving  Outcome Summary: Pt with no complaints upon entering room.  Pt up in chair.  Pt ambulated with RW and SBA with cues for posture and proper breathing.  No LOB noted.  Reviewed HEP [facility] pt requiring min A  for recalling.  Pt anticipated DC home this date to home with  services and family as needed.          Outcome Measures     Row Name 07/12/18 1040 07/11/18 1320 07/10/18 1515       How much help from another person do you currently need...    Turning from your back to your side while in flat bed without using bedrails? 4  -AA 4  -SC 4  -SC    Moving from lying on back to sitting on the side of a flat bed without bedrails? 4  -AA 4  -SC 3   -SC    Moving to and from a bed to a chair (including a wheelchair)? 3  -AA 3  -SC 3  -SC    Standing up from a chair using your arms (e.g., wheelchair, bedside chair)? 3  -AA 3  -SC 3  -SC    Climbing 3-5 steps with a railing? 3  -AA 3  -SC 3  -SC    To walk in hospital room? 3  -AA 3  -SC 3  -SC    AM-PAC 6 Clicks Score 20  -AA 20  -SC 19  -SC       Functional Assessment    Outcome Measure Options AM-PAC 6 Clicks Basic Mobility (PT)  -AA AM-PAC 6 Clicks Basic Mobility (PT)  -SC AM-PAC 6 Clicks Basic Mobility (PT)  -SC    Row Name 07/10/18 0745             How much help from another is currently needed...    Putting on and taking off regular lower body clothing? 2  -AC      Bathing (including washing, rinsing, and drying) 2  -AC      Toileting (which includes using toilet bed pan or urinal) 3  -AC      Putting on and taking off regular upper body clothing 3  -AC      Taking care of personal grooming (such as brushing teeth) 4  -AC      Eating meals 4  -AC      Score 18  -AC         Functional Assessment    Outcome Measure Options AM-PAC 6 Clicks Daily Activity (OT)  -AC        User Key  (r) = Recorded By, (t) = Taken By, (c) = Cosigned By    Initials Name Provider Type    SC Hilda Naidu, PT Physical Therapist    PHILOMENA Smith, OT Occupational Therapist    MELANI Palma, PT Physical Therapist           Time Calculation:         PT Charges     Row Name 07/12/18 1040             Time Calculation    Start Time 1040  -AA      PT Received On 07/12/18  -      PT Goal Re-Cert Due Date 07/20/18  -         Time Calculation- PT    Total Timed Code Minutes- PT 24 minute(s)  -AA         Timed Charges    36367 - PT Therapeutic Exercise Minutes 10  -AA      47013 - Gait Training Minutes  14  -AA        User Key  (r) = Recorded By, (t) = Taken By, (c) = Cosigned By    Initials Name Provider Type    MELANI Palma, PT Physical Therapist        Therapy Suggested Charges     Code   Minutes Charges    77102 (CPT®) Hc  Pt Neuromusc Re Education Ea 15 Min      65635 (CPT®) Hc Pt Ther Proc Ea 15 Min 10 1    51902 (CPT®) Hc Gait Training Ea 15 Min 14 1    70669 (CPT®) Hc Pt Therapeutic Act Ea 15 Min      21642 (CPT®) Hc Pt Manual Therapy Ea 15 Min      10103 (CPT®) Hc Pt Iontophoresis Ea 15 Min      04310 (CPT®) Hc Pt Elec Stim Ea-Per 15 Min      57075 (CPT®) Hc Pt Ultrasound Ea 15 Min      51957 (CPT®) Hc Pt Self Care/Mgmt/Train Ea 15 Min      Total  24 2        Therapy Charges for Today     Code Description Service Date Service Provider Modifiers Qty    54994070567 HC PT THER PROC EA 15 MIN 7/12/2018 April TOBY Palma, PT GP 1    57291159257 HC GAIT TRAINING EA 15 MIN 7/12/2018 April TOBY Palma PT GP 1          PT G-Codes  Outcome Measure Options: AM-PAC 6 Clicks Basic Mobility (PT)  Score: 19  Functional Limitation: Mobility: Walking and moving around  Mobility: Walking and Moving Around Current Status (): At least 20 percent but less than 40 percent impaired, limited or restricted  Mobility: Walking and Moving Around Goal Status (): At least 1 percent but less than 20 percent impaired, limited or restricted    April TOBY Palma PT  7/12/2018

## 2018-07-13 NOTE — THERAPY DISCHARGE NOTE
Acute Care - Occupational Therapy Discharge Summary  [facility]     Patient Name: Zee Stewart  : 1940  MRN: [id number]    Today's Date: 2018  Onset of Illness/Injury or Date of Surgery: 18    Date of Referral to OT: 18  Referring Physician: Dr Loera      Admit Date: 2018        OT Recommendation and Plan    Visit Dx:    ICD-10-CM ICD-9-CM   1. Acute congestive heart failure, unspecified congestive heart failure type (CMS/[facility]) I50.9 428.0   2. Hypoxia R09.02 799.02   3. Myositis of lower extremity, unspecified laterality, unspecified myositis type M60.869 729.1   4. Abnormal blood level of uric acid E79.0 790.6   5. Positive D dimer R79.89 790.92   6. Chronic anticoagulation Z79.01 V58.61   7. Chronic atrial fibrillation (CMS/[facility]) I48.2 427.31   8. DDD (degenerative disc disease), lumbar M51.36 722.52   9. Deterioration in ability to walk R26.2 781.2   10. Hypokalemia E87.6 276.8   11. Impaired mobility and ADLs Z74.09 799.89   12. Impaired functional mobility, balance, gait, and endurance Z74.09 V49.89                     OT Rehab Goals     Row Name 18 0711             Transfer Goal 1 (OT)    Progress/Outcome (Transfer Goal 1, OT) goal not met;discharged from facility  -IVANA         Dressing Goal 1 (OT)    Progress/Outcome (Dressing Goal 1, OT) goal not met;discharged from facility  -         Strength Goal 1 (OT)    Progress/Outcome (Strength Goal 1, OT) goal not met;discharged from facility  -         Problem Specific Goal 1 (OT)    Progress/Outcome (Problem Specific Goal 1, OT) goal not met;discharged from facility  -IVANA        User Key  (r) = Recorded By, (t) = Taken By, (c) = Cosigned By    Initials Name Provider Type Discipline    IVANA Gant, OT Occupational Therapist OT                Outcome Measures     Row Name 18 1300 18 1040 18 1320       How much help from another person do you currently need...    Turning from your back to your side while in  flat bed without using bedrails?  -- 4  -AA 4  -SC    Moving from lying on back to sitting on the side of a flat bed without bedrails?  -- 4  -AA 4  -SC    Moving to and from a bed to a chair (including a wheelchair)?  -- 3  -AA 3  -SC    Standing up from a chair using your arms (e.g., wheelchair, bedside chair)?  -- 3  -AA 3  -SC    Climbing 3-5 steps with a railing?  -- 3  -AA 3  -SC    To walk in hospital room?  -- 3  -AA 3  -SC    AM-PAC 6 Clicks Score  -- 20  -AA 20  -SC       Functional Assessment    Outcome Measure Options Modified Jitendra  -CW AM-PAC 6 Clicks Basic Mobility (PT)  -AA AM-PAC 6 Clicks Basic Mobility (PT)  -SC    Row Name 07/10/18 1515 07/10/18 0745          How much help from another person do you currently need...    Turning from your back to your side while in flat bed without using bedrails? 4  -SC  --     Moving from lying on back to sitting on the side of a flat bed without bedrails? 3  -SC  --     Moving to and from a bed to a chair (including a wheelchair)? 3  -SC  --     Standing up from a chair using your arms (e.g., wheelchair, bedside chair)? 3  -SC  --     Climbing 3-5 steps with a railing? 3  -SC  --     To walk in hospital room? 3  -SC  --     AM-PAC 6 Clicks Score 19  -SC  --        How much help from another is currently needed...    Putting on and taking off regular lower body clothing?  -- 2  -AC     Bathing (including washing, rinsing, and drying)  -- 2  -AC     Toileting (which includes using toilet bed pan or urinal)  -- 3  -AC     Putting on and taking off regular upper body clothing  -- 3  -AC     Taking care of personal grooming (such as brushing teeth)  -- 4  -AC     Eating meals  -- 4  -AC     Score  -- 18  -AC        Functional Assessment    Outcome Measure Options AM-PAC 6 Clicks Basic Mobility (PT)  -SC AM-PAC 6 Clicks Daily Activity (OT)  -       User Key  (r) = Recorded By, (t) = Taken By, (c) = Cosigned By    Initials Name Provider Type    SHARIFA Stevensp, PT  Physical Therapist    AC Nilda Smith, OT Occupational Therapist    CW Leonel Felix, PT Physical Therapist    AA Latasha Palma, PT Physical Therapist          Therapy Suggested Charges     Code   Minutes Charges    92795 (CPT®) Hc Ot Neuromusc Re Education Ea 15 Min      54551 (CPT®) Hc Ot Ther Proc Ea 15 Min      21543 (CPT®) Hc Ot Therapeutic Act Ea 15 Min      51593 (CPT®) Hc Ot Manual Therapy Ea 15 Min      45049 (CPT®) Hc Ot Iontophoresis Ea 15 Min      57914 (CPT®) Hc Ot Elec Stim Ea-Per 15 Min      24038 (CPT®) Hc Ot Ultrasound Ea 15 Min      23219 (CPT®) Hc Ot Self Care/Mgmt/Train Ea 15 Min 8 1    Total  8 1              OT Discharge Summary  Reason for Discharge: Discharge from facility  Outcomes Achieved: Other (Goals not met.  Patient seen for evaluation only.)  Discharge Destination: Home with home health      Annette Gant OT  7/13/2018

## 2018-07-15 NOTE — THERAPY DISCHARGE NOTE
Acute Care - Physical Therapy Discharge Summary  [facility]       Patient Name: Zee Stewart  : 1940  MRN: [id number]    Today's Date: 7/15/2018  Onset of Illness/Injury or Date of Surgery: 18    Date of Referral to PT: 18  Referring Physician: Dr Loera      Admit Date: 2018      PT Recommendation and Plan    Visit Dx:    ICD-10-CM ICD-9-CM   1. Acute congestive heart failure, unspecified congestive heart failure type (CMS/[facility]) I50.9 428.0   2. Hypoxia R09.02 799.02   3. Myositis of lower extremity, unspecified laterality, unspecified myositis type M60.869 729.1   4. Abnormal blood level of uric acid E79.0 790.6   5. Positive D dimer R79.89 790.92   6. Chronic anticoagulation Z79.01 V58.61   7. Chronic atrial fibrillation (CMS/[facility]) I48.2 427.31   8. DDD (degenerative disc disease), lumbar M51.36 722.52   9. Deterioration in ability to walk R26.2 781.2   10. Hypokalemia E87.6 276.8   11. Impaired mobility and ADLs Z74.09 799.89   12. Impaired functional mobility, balance, gait, and endurance Z74.09 V49.89             Outcome Measures     Row Name 18 1300             Functional Assessment    Outcome Measure Options Modified Jitendra  -        User Key  (r) = Recorded By, (t) = Taken By, (c) = Cosigned By    Initials Name Provider Type    TODD Felix, PT Physical Therapist            Therapy Suggested Charges     Code   Minutes Charges    23134 (CPT®)  Pt Neuromusc Re Education Ea 15 Min      36619 (CPT®) Hc Pt Ther Proc Ea 15 Min 10 1    55954 (CPT®) Hc Gait Training Ea 15 Min 14 1    72148 (CPT®) Hc Pt Therapeutic Act Ea 15 Min      47891 (CPT®) Hc Pt Manual Therapy Ea 15 Min      58888 (CPT®) Hc Pt Iontophoresis Ea 15 Min      64176 (CPT®) Hc Pt Elec Stim Ea-Per 15 Min      79809 (CPT®) Hc Pt Ultrasound Ea 15 Min      48697 (CPT®) Hc Pt Self Care/Mgmt/Train Ea 15 Min      Total  24 2                PT Rehab Goals     Row Name 07/15/18 1047             Bed Mobility Goal 1  (PT)    Activity/Assistive Device (Bed Mobility Goal 1, PT) supine to sit  -MC      [place] Level/Cues Needed (Bed Mobility Goal 1, PT) independent  -MC      Time Frame (Bed Mobility Goal 1, PT) long term goal (LTG);10 days  -MC         Transfer Goal 1 (PT)    Activity/Assistive Device (Transfer Goal 1, PT) sit-to-stand/stand-to-sit  -MC      [place] Level/Cues Needed (Transfer Goal 1, PT) conditional independence  -MC      Time Frame (Transfer Goal 1, PT) long term goal (LTG);10 days  -MC         Gait Training Goal 1 (PT)    Activity/Assistive Device (Gait Training Goal 1, PT) gait (walking locomotion);walker, rolling  -MC      [place] Level (Gait Training Goal 1, PT) conditional independence  -MC      Distance (Gait Goal 1, PT) 350  -MC      Time Frame (Gait Training Goal 1, PT) long term goal (LTG);10 days  -MC        User Key  (r) = Recorded By, (t) = Taken By, (c) = Cosigned By    Initials Name Provider Type Discipline    NANI Mon, PT Physical Therapist PT              PT Discharge Summary  Anticipated Discharge Disposition (PT): home with assist, home with home health  Reason for Discharge: Discharge from facility  Outcomes Achieved: Refer to plan of care for updates on goals achieved  Discharge Destination: Home with assist, Home with home health      Melida Mon, PT   7/15/2018